# Patient Record
Sex: MALE | Race: WHITE | NOT HISPANIC OR LATINO | Employment: UNEMPLOYED | URBAN - METROPOLITAN AREA
[De-identification: names, ages, dates, MRNs, and addresses within clinical notes are randomized per-mention and may not be internally consistent; named-entity substitution may affect disease eponyms.]

---

## 2018-06-15 RX ORDER — CARBIDOPA AND LEVODOPA 25; 100 MG/1; MG/1
TABLET ORAL
Qty: 180 TABLET | Refills: 3 | COMMUNITY
Start: 2018-06-15 | End: 2018-06-19

## 2018-06-15 NOTE — PROGRESS NOTES
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     No other major issues.     [facility]  He is working for a [employer] in [place]    Negative family history of dystonia        Medications     9am 9pm       Carbidopa/levodopa Sinemet 25/100 1 +/-1                                                                                                                                  History obtained from patient      Coding statement:   Duration of  Services: patient care and care coordination was 10 minutes  Greater than 50% of this visit was spent in counseling and coordination of care.     Long Henry MD     ______________________________________    Last visit date and details:        PATIENT: Tres Ospina     : 1985     SUNSHINE: May 7, 2015     REASON FOR VISIT:  Dopamine responsive dystonia follow up.     HPI: Mr. Tres Ospina is a 29 year old ambidextrous  male who came to the [facility] neurology clinic by himself for a follow up visit.  I saw him last in the clinic in 2012 for a routine f/u & to give him a one-year Rx refill.   Since then, he had no shows & cancellations.  He came today as he was told that we'll not refill his Rx without seeing him.       He reports no worsening symptoms since .  He occasionally gets leg cramps, but is manageable.  Lately, he has been having problems with sleep.  His PCP was wondering if it was due to leg grams.  He rarely wakes up due to leg cramps.  He has been on Sinemet 25/100 mg 1 tab daily since .  He gets groggy in the morning after he takes it.  Otherwise, no side effects.       ROS: -   MOTOR FUNCTION   Tremor: Denies.   Bradykinesia: Denies.  Speech:  Denies.  Rigidity: Denies.   Gait: No change.   Falls: Denies.   Dyskinesia: Denies.   Pain: When he gets cramps in legs.  At times it's in right let & other times in left leg.    Numbness: Denies.   Tingling: Denies.      AUTONOMIC FUNCTION   Orthostatic Hypotension: Only when taking Sinemet on empty stomach.   Constipation: Denies.   Urinary symptoms: Denies.      MENTATION, BEHAVIOR, MOOD   Depression, anxiety: Has slight anxiety.   Fatigue: In the morning due to not sleeping well & side effect from Sinemet.   Memory problems: No.   Confusion, hallucinations: Denies.   Sleep Problems: He wakes up a few times at night.  At times has difficulty falling back to sleep.  He has used Melatonin low dose without much benefit.     OTHERS   Trouble swallowing, drooling: Denies.  Changes in sense of smelling: Denies.  Trouble with handwriting: Denies.  Trouble with handling utensils, dressing: Denies.   Exercise: Goes on a walk.  Will join gym next week.      Allscripts Notes Reviewed:       April 22, 2005: Note by Cole Guzman, Genetic Counselor     GENETIC COUNSELING-NEUROLOGY CLINIC  Tres Ospina is an 19-year-old man with dystonia.  Previous molecular testing was negative for the GAG deletion in the DYT1 gene.  Tres was subsequently put on a trial of Sinemet and had a dramatic response to this medication.  Based upon this response, DNA was forwarded from [facility] to the [facility] laboratory for GTP Cyclohydrolase sequencing.     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  This change has been previously described in individuals diagnosed with DRD.  In addition, functional assays have demonstrated that this change interferes with normal splicing of the GTP cyclohydrolase gene product.  Taken together,  "this evidence strongly suggests that this mutation is responsible for Justin' symptoms.        MEDICATIONS:        Medication Sig     carbidopa-levodopa (SINEMET)  MG per tablet 1 tab at 10am for dystonia      ALLERGIES:  No known allergies.     PROBLEM LIST: PMH:  DOPA responsive dystonia and Depression.     PSH: None.      FH:  Depression in his father, mother, and sister; Hypertension in his mother; Anxiety & ADHD in his sister.     SH:  Quit smoking in 2007. He only smoked for 6 months occasionally. Drinks alcohol occasionally. He does not use illicit drugs.  Lives by himself in [facility].  His girlfriend of 3 years recently broke up with him.       PHYSICAL EXAM:     VITAL SIGNS:  Blood pressure 122/79, pulse 66, height 1.892 m (6' 2.5\"), weight 70.761 kg (156 lb).  Body mass index is 19.77 kg/(m^2).     GENERAL:  Mr. Ospina is a pleasant  male who is well-groomed, well-developed and thin sitting comfortably in the exam room without any distress.  Affect is appropriate.     MOVEMENT DISORDERS ASSESSMENT: (Last Sinemet was about 1.5 hrs ago)  Speech: Normal.  Facial Expression: Flat.   Rest Tremor: Absent in all extremities.   Dystonia:  No noticeable dystonic posturing.   Action/Postural Tremor: Slight high-frequency low amplitude postural tremor bilaterally.  No action tremor.  Rigidity: Absent in all extremities.   Finger Taps: Normal.   Hand opening & closing: Normal.   Hand pronation & supination: Normal.   Leg Agility: Normal.   Arising from chair - arms folded across chest: Normal.  Posture: Normal erect.  Gait: Normal.  Postural Stability: Normal.  Body Bradykinesia: None.     ASSESSMENT/PLAN:     Mr. Ospina is a 29 year old  male with a diagnosis of dopamine responsive dystonia since 2005 who came to the clinic for a f/u visit.  Today's exam didn't show any dystonic posturing.  Pt reports that there is no progression in his motor symptoms.  Except for occasional leg " cramping, he has no additional symptoms.  He continues taking low dose Levodopa.     I reviewed notes & genetic testing as mentioned above, which were completed in 2005.  Since his leg cramping has responded to Levodopa, I'll refilled his Rx for 1 year.  He was informed to return to clinic in 1 year or sooner as needed.      The total time spent with the patient was 30 minutes, and greater than 50% of this time was spent in counseling and coordination of care.     Belkis Syed, APRN,  CNP  [facility]                ______________________________________      Patient was asked about 14 Review of systems including changes in vision (dry eyes, double vision), hearing, heart, lungs, musculoskeletal, depression, anxiety, snoring, RBD, insomnia, urinary frequency, urinary urgency, constipation, swallowing problems, hematological, ID, allergies, skin problems: seborrhea, endocrinological: thyroid, diabetes, cholesterol; balance, weight changes, and other neurological problems and these were not significant at this time except for   Patient Active Problem List   Diagnosis     DOPA responsive dystonia     Depression        No Known Allergies  No past surgical history on file.  Past Medical History:   Diagnosis Date     Depression      DOPA responsive dystonia 11/1/2011     Social History     Social History     Marital status: Single     Spouse name: N/A     Number of children: N/A     Years of education: N/A     Occupational History     Inventory Management      [employer]     Social History Main Topics     Smoking status: Former Smoker     Smokeless tobacco: Not on file      Comment: Quit in 2007. Smoked occasionally. Smoked for 6 months.     Alcohol use Yes      Comment: Drinks occaionally.      Drug use: No     Sexual activity: Not on file     Other Topics Concern     Not on file     Social History Narrative    Quit smoking in 2007. He only smoked for 6 months occasionally. Drinks alcohol  occaionally. Lives by himself in [facility].        Drug and lactation database from the [place] National Library of Medicine:  http://toxnet.nlm.nih.gov/cgi-bin/sis/htmlgen?LACT      B/P: Data Unavailable, T: Data Unavailable, P: Data Unavailable, R: Data Unavailable 0 lbs 0 oz  There were no vitals taken for this visit., There is no height or weight on file to calculate BMI.  Medications and Vitals not listed above were documented in the cart and reviewed by me.     Current Outpatient Prescriptions   Medication Sig Dispense Refill     carbidopa-levodopa (SINEMET)  MG per tablet Take 1 tablet by mouth 2 times daily 180 tablet 3         Long Henry MD  Answers for HPI/ROS submitted by the patient on 6/19/2018   General Symptoms: No  Skin Symptoms: No  HENT Symptoms: No  EYE SYMPTOMS: No  HEART SYMPTOMS: No  LUNG SYMPTOMS: No  INTESTINAL SYMPTOMS: No  URINARY SYMPTOMS: No  REPRODUCTIVE SYMPTOMS: No  SKELETAL SYMPTOMS: No  BLOOD SYMPTOMS: No  NERVOUS SYSTEM SYMPTOMS: No  MENTAL HEALTH SYMPTOMS: No

## 2018-06-19 ENCOUNTER — OFFICE VISIT (OUTPATIENT)
Dept: NEUROLOGY | Facility: CLINIC | Age: 33
End: 2018-06-19
Payer: COMMERCIAL

## 2018-06-19 VITALS
DIASTOLIC BLOOD PRESSURE: 90 MMHG | HEIGHT: 74 IN | BODY MASS INDEX: 21.96 KG/M2 | HEART RATE: 65 BPM | SYSTOLIC BLOOD PRESSURE: 145 MMHG | WEIGHT: 171.1 LBS

## 2018-06-19 DIAGNOSIS — G24.9 DYSTONIA: ICD-10-CM

## 2018-06-19 DIAGNOSIS — G24.1 DOPA RESPONSIVE DYSTONIA: Primary | ICD-10-CM

## 2018-06-19 RX ORDER — CARBIDOPA AND LEVODOPA 25; 100 MG/1; MG/1
TABLET ORAL
Qty: 180 TABLET | Refills: 3 | Status: SHIPPED | OUTPATIENT
Start: 2018-06-19 | End: 2019-12-24

## 2018-06-19 RX ORDER — CARBIDOPA AND LEVODOPA 25; 100 MG/1; MG/1
1 TABLET ORAL
COMMUNITY
Start: 2017-01-24 | End: 2018-06-19

## 2018-06-19 ASSESSMENT — PAIN SCALES - GENERAL: PAINLEVEL: NO PAIN (0)

## 2018-06-19 NOTE — LETTER
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     No other major issues.     [facility]  He is working for a [employer] in [place]    Negative family history of dystonia        Medications     9am 9pm       Carbidopa/levodopa Sinemet 25/100 1 +/-1                                                                                                                                  History obtained from patient      Coding statement:   Duration of  Services: patient care and care coordination was 10 minutes  Greater than 50% of this visit was spent in counseling and coordination of care.     Long Henry MD     ______________________________________    Last visit date and details:        PATIENT: Tres Ospina     : 1985     SUNSHINE: May 7, 2015     REASON FOR VISIT:  Dopamine responsive dystonia follow up.     HPI: Mr. Tres Ospina is a 29 year old ambidextrous  male who came to the [facility] neurology clinic by himself for a follow up visit.  I saw him last in the clinic in 2012 for a routine f/u & to give him a one-year Rx refill.   Since then, he had no shows & cancellations.  He came today as he was told that we'll not refill his Rx without seeing him.       He reports no worsening symptoms since .  He occasionally gets leg cramps, but is manageable.  Lately, he has been having problems with sleep.  His PCP was wondering if it was due to leg grams.  He rarely wakes up due to leg cramps.  He has been on Sinemet 25/100 mg 1 tab daily since .  He gets groggy in the morning after he takes it.  Otherwise, no  side effects.       ROS: -   MOTOR FUNCTION   Tremor: Denies.   Bradykinesia: Denies.  Speech: Denies.  Rigidity: Denies.   Gait: No change.   Falls: Denies.   Dyskinesia: Denies.   Pain: When he gets cramps in legs.  At times it's in right let & other times in left leg.    Numbness: Denies.   Tingling: Denies.      AUTONOMIC FUNCTION   Orthostatic Hypotension: Only when taking Sinemet on empty stomach.   Constipation: Denies.   Urinary symptoms: Denies.      MENTATION, BEHAVIOR, MOOD   Depression, anxiety: Has slight anxiety.   Fatigue: In the morning due to not sleeping well & side effect from Sinemet.   Memory problems: No.   Confusion, hallucinations: Denies.   Sleep Problems: He wakes up a few times at night.  At times has difficulty falling back to sleep.  He has used Melatonin low dose without much benefit.     OTHERS   Trouble swallowing, drooling: Denies.  Changes in sense of smelling: Denies.  Trouble with handwriting: Denies.  Trouble with handling utensils, dressing: Denies.   Exercise: Goes on a walk.  Will join gym next week.      Allscripts Notes Reviewed:       April 22, 2005: Note by Cole Guzman, Genetic Counselor     GENETIC COUNSELING-NEUROLOGY CLINIC  Tres Ospina is an 19-year-old man with dystonia.  Previous molecular testing was negative for the GAG deletion in the DYT1 gene.  Tres was subsequently put on a trial of Sinemet and had a dramatic response to this medication.  Based upon this response, DNA was forwarded from [facility] to the [facility] laboratory for GTP Cyclohydrolase sequencing.     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  This change has been previously described in individuals diagnosed with DRD.  In addition, functional assays have demonstrated that this  "change interferes with normal splicing of the GTP cyclohydrolase gene product.  Taken together, this evidence strongly suggests that this mutation is responsible for Justin' symptoms.        MEDICATIONS:        Medication Sig     carbidopa-levodopa (SINEMET)  MG per tablet 1 tab at 10am for dystonia      ALLERGIES:  No known allergies.     PROBLEM LIST: PMH:  DOPA responsive dystonia and Depression.     PSH: None.      FH:  Depression in his father, mother, and sister; Hypertension in his mother; Anxiety & ADHD in his sister.     SH:  Quit smoking in 2007. He only smoked for 6 months occasionally. Drinks alcohol occasionally. He does not use illicit drugs.  Lives by himself in [facility].  His girlfriend of 3 years recently broke up with him.       PHYSICAL EXAM:     VITAL SIGNS:  Blood pressure 122/79, pulse 66, height 1.892 m (6' 2.5\"), weight 70.761 kg (156 lb).  Body mass index is 19.77 kg/(m^2).     GENERAL:  Mr. Ospina is a pleasant  male who is well-groomed, well-developed and thin sitting comfortably in the exam room without any distress.  Affect is appropriate.     MOVEMENT DISORDERS ASSESSMENT: (Last Sinemet was about 1.5 hrs ago)  Speech: Normal.  Facial Expression: Flat.   Rest Tremor: Absent in all extremities.   Dystonia:  No noticeable dystonic posturing.   Action/Postural Tremor: Slight high-frequency low amplitude postural tremor bilaterally.  No action tremor.  Rigidity: Absent in all extremities.   Finger Taps: Normal.   Hand opening & closing: Normal.   Hand pronation & supination: Normal.   Leg Agility: Normal.   Arising from chair - arms folded across chest: Normal.  Posture: Normal erect.  Gait: Normal.  Postural Stability: Normal.  Body Bradykinesia: None.     ASSESSMENT/PLAN:     Mr. Ospina is a 29 year old  male with a diagnosis of dopamine responsive dystonia since 2005 who came to the clinic for a f/u visit.  Today's exam didn't show any dystonic " posturing.  Pt reports that there is no progression in his motor symptoms.  Except for occasional leg cramping, he has no additional symptoms.  He continues taking low dose Levodopa.     I reviewed notes & genetic testing as mentioned above, which were completed in 2005.  Since his leg cramping has responded to Levodopa, I'll refilled his Rx for 1 year.  He was informed to return to clinic in 1 year or sooner as needed.      The total time spent with the patient was 30 minutes, and greater than 50% of this time was spent in counseling and coordination of care.     Belkis Syed, APRN,  CNP  [facility]                ______________________________________      Patient was asked about 14 Review of systems including changes in vision (dry eyes, double vision), hearing, heart, lungs, musculoskeletal, depression, anxiety, snoring, RBD, insomnia, urinary frequency, urinary urgency, constipation, swallowing problems, hematological, ID, allergies, skin problems: seborrhea, endocrinological: thyroid, diabetes, cholesterol; balance, weight changes, and other neurological problems and these were not significant at this time except for   Patient Active Problem List   Diagnosis     DOPA responsive dystonia     Depression        No Known Allergies  No past surgical history on file.  Past Medical History:   Diagnosis Date     Depression      DOPA responsive dystonia 11/1/2011     Social History     Social History     Marital status: Single     Spouse name: N/A     Number of children: N/A     Years of education: N/A     Occupational History     Inventory Management      [employer]     Social History Main Topics     Smoking status: Former Smoker     Smokeless tobacco: Not on file      Comment: Quit in 2007. Smoked occasionally. Smoked for 6 months.     Alcohol use Yes      Comment: Drinks occaionally.      Drug use: No     Sexual activity: Not on file     Other Topics Concern     Not on file      Social History Narrative    Quit smoking in 2007. He only smoked for 6 months occasionally. Drinks alcohol occaionally. Lives by himself in [facility].        Drug and lactation database from the [place] National Library of Medicine:  http://toxnet.nlm.nih.gov/cgi-bin/sis/htmlgen?LACT      B/P: Data Unavailable, T: Data Unavailable, P: Data Unavailable, R: Data Unavailable 0 lbs 0 oz  There were no vitals taken for this visit., There is no height or weight on file to calculate BMI.  Medications and Vitals not listed above were documented in the cart and reviewed by me.     Current Outpatient Prescriptions   Medication Sig Dispense Refill     carbidopa-levodopa (SINEMET)  MG per tablet Take 1 tablet by mouth 2 times daily 180 tablet 3         Long Henry MD

## 2018-06-19 NOTE — Clinical Note
2018       RE: Tres Ospina  [address]  [place] [address]     Dear Colleague,    Thank you for referring your patient, Tres Ospina, to the [facility] NEUROLOGY at [facility]. Please see a copy of my visit note below.    Diagnosis/Summary/Recommendations:    PATIENT: Tres Ospina  32 year old male     : 1985    SUNSHINE: 2018    Dopa responsive dystonia    Medications            Carbidopa/levodopa Sinemet 25/100                                                                                                                                    History obtained from patient      Coding statement:   Duration of  Services: patient care and care coordination was *** minutes  Greater than 50% of this visit was spent in counseling and coordination of care.     Long Henry MD     ______________________________________    Last visit date and details:        PATIENT: Tres Ospina     : 1985     SUNSHINE: May 7, 2015     REASON FOR VISIT:  Dopamine responsive dystonia follow up.     HPI: Mr. Tres Ospina is a 29 year old ambidextrous  male who came to the [facility] neurology clinic by himself for a follow up visit.  I saw him last in the clinic in 2012 for a routine f/u & to give him a one-year Rx refill.   Since then, he had no shows & cancellations.  He came today as he was told that we'll not refill his Rx without seeing him.       He reports no worsening symptoms since .  He occasionally gets leg cramps, but is manageable.  Lately, he has been having problems with sleep.  His PCP was wondering if it was due to leg grams.  He rarely wakes up due to leg cramps.  He has been on Sinemet 25/100 mg 1 tab daily since .  He gets groggy in the morning after he takes it.  Otherwise, no side effects.       ROS: -   MOTOR FUNCTION   Tremor: Denies.   Bradykinesia: Denies.  Speech: Denies.  Rigidity: Denies.    Gait: No change.   Falls: Denies.   Dyskinesia: Denies.   Pain: When he gets cramps in legs.  At times it's in right let & other times in left leg.    Numbness: Denies.   Tingling: Denies.      AUTONOMIC FUNCTION   Orthostatic Hypotension: Only when taking Sinemet on empty stomach.   Constipation: Denies.   Urinary symptoms: Denies.      MENTATION, BEHAVIOR, MOOD   Depression, anxiety: Has slight anxiety.   Fatigue: In the morning due to not sleeping well & side effect from Sinemet.   Memory problems: No.   Confusion, hallucinations: Denies.   Sleep Problems: He wakes up a few times at night.  At times has difficulty falling back to sleep.  He has used Melatonin low dose without much benefit.     OTHERS   Trouble swallowing, drooling: Denies.  Changes in sense of smelling: Denies.  Trouble with handwriting: Denies.  Trouble with handling utensils, dressing: Denies.   Exercise: Goes on a walk.  Will join gym next week.      [employer] Notes Reviewed:       April 22, 2005: Note by Cole Guzman, Genetic Counselor     GENETIC COUNSELING-NEUROLOGY CLINIC  Tres Ospina is an 19-year-old man with dystonia.  Previous molecular testing was negative for the GAG deletion in the DYT1 gene.  Tres was subsequently put on a trial of Sinemet and had a dramatic response to this medication.  Based upon this response, DNA was forwarded from [facility] to the [facility] laboratory for GTP Cyclohydrolase sequencing.     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  This change has been previously described in individuals diagnosed with DRD.  In addition, functional assays have demonstrated that this change interferes with normal splicing of the GTP cyclohydrolase gene product.  Taken together, this evidence strongly  "suggests that this mutation is responsible for Justin' symptoms.        MEDICATIONS:        Medication Sig     carbidopa-levodopa (SINEMET)  MG per tablet 1 tab at 10am for dystonia      ALLERGIES:  No known allergies.     PROBLEM LIST: PMH:  DOPA responsive dystonia and Depression.     PSH: None.      FH:  Depression in his father, mother, and sister; Hypertension in his mother; Anxiety & ADHD in his sister.     SH:  Quit smoking in 2007. He only smoked for 6 months occasionally. Drinks alcohol occasionally. He does not use illicit drugs.  Lives by himself in [facility].  His girlfriend of 3 years recently broke up with him.       PHYSICAL EXAM:     VITAL SIGNS:  Blood pressure 122/79, pulse 66, height 1.892 m (6' 2.5\"), weight 70.761 kg (156 lb).  Body mass index is 19.77 kg/(m^2).     GENERAL:  Mr. Ospina is a pleasant  male who is well-groomed, well-developed and thin sitting comfortably in the exam room without any distress.  Affect is appropriate.     MOVEMENT DISORDERS ASSESSMENT: (Last Sinemet was about 1.5 hrs ago)  Speech: Normal.  Facial Expression: Flat.   Rest Tremor: Absent in all extremities.   Dystonia:  No noticeable dystonic posturing.   Action/Postural Tremor: Slight high-frequency low amplitude postural tremor bilaterally.  No action tremor.  Rigidity: Absent in all extremities.   Finger Taps: Normal.   Hand opening & closing: Normal.   Hand pronation & supination: Normal.   Leg Agility: Normal.   Arising from chair - arms folded across chest: Normal.  Posture: Normal erect.  Gait: Normal.  Postural Stability: Normal.  Body Bradykinesia: None.     ASSESSMENT/PLAN:     Mr. Ospina is a 29 year old  male with a diagnosis of dopamine responsive dystonia since 2005 who came to the clinic for a f/u visit.  Today's exam didn't show any dystonic posturing.  Pt reports that there is no progression in his motor symptoms.  Except for occasional leg cramping, he has no " additional symptoms.  He continues taking low dose Levodopa.     I reviewed notes & genetic testing as mentioned above, which were completed in 2005.  Since his leg cramping has responded to Levodopa, I'll refilled his Rx for 1 year.  He was informed to return to clinic in 1 year or sooner as needed.      The total time spent with the patient was 30 minutes, and greater than 50% of this time was spent in counseling and coordination of care.     Belkis Syed, APRN,  CNP  [facility]                ______________________________________      Patient was asked about 14 Review of systems including changes in vision (dry eyes, double vision), hearing, heart, lungs, musculoskeletal, depression, anxiety, snoring, RBD, insomnia, urinary frequency, urinary urgency, constipation, swallowing problems, hematological, ID, allergies, skin problems: seborrhea, endocrinological: thyroid, diabetes, cholesterol; balance, weight changes, and other neurological problems and these were not significant at this time except for   Patient Active Problem List   Diagnosis     DOPA responsive dystonia     Depression        No Known Allergies  No past surgical history on file.  Past Medical History:   Diagnosis Date     Depression      DOPA responsive dystonia 11/1/2011     Social History     Social History     Marital status: Single     Spouse name: N/A     Number of children: N/A     Years of education: N/A     Occupational History     Inventory Management      [employer]     Social History Main Topics     Smoking status: Former Smoker     Smokeless tobacco: Not on file      Comment: Quit in 2007. Smoked occasionally. Smoked for 6 months.     Alcohol use Yes      Comment: Drinks occaionally.      Drug use: No     Sexual activity: Not on file     Other Topics Concern     Not on file     Social History Narrative    Quit smoking in 2007. He only smoked for 6 months occasionally. Drinks alcohol occaionally. Lives  by himself in [facility].        Drug and lactation database from the [place] National Library of Medicine:  http://toxnet.nlm.nih.gov/cgi-bin/sis/htmlgen?LACT      B/P: Data Unavailable, T: Data Unavailable, P: Data Unavailable, R: Data Unavailable 0 lbs 0 oz  There were no vitals taken for this visit., There is no height or weight on file to calculate BMI.  Medications and Vitals not listed above were documented in the cart and reviewed by me.     Current Outpatient Prescriptions   Medication Sig Dispense Refill     carbidopa-levodopa (SINEMET)  MG per tablet Take 1 tablet by mouth 2 times daily 180 tablet 3         Long Henry MD    Again, thank you for allowing me to participate in the care of your patient.      Sincerely,    Long Henry MD

## 2018-06-19 NOTE — MR AVS SNAPSHOT
"              After Visit Summary   2018    Tres Ospina    MRN: [id number]           Patient Information     Date Of Birth          1985        Visit Information        Provider Department      2018 7:10 AM Long Henry MD [facility]        Today's Diagnoses     DOPA responsive dystonia    -  1    Dystonia           Follow-ups after your visit        Follow-up notes from your care team     Return in about 1 year (around 2019).      Who to contact     Please call your clinic at [phone] to:    Ask questions about your health    Make or cancel appointments    Discuss your medicines    Learn about your test results    Speak to your doctor            Additional Information About Your Visit        MyChart Information     [employer] is an electronic gateway that provides easy, online access to your medical records. With [employer], you can request a clinic appointment, read your test results, renew a prescription or communicate with your care team.     To sign up for [employer] visit the website at [email or web address]   You will be asked to enter the access code listed below, as well as some personal information. Please follow the directions to create your username and password.     Your access code is: [email or web address]  Expires: 2018  6:30 AM     Your access code will  in 90 days. If you need help or a new code, please contact your [facility] or call [phone] for assistance.        Care EveryWhere ID     This is your Care EveryWhere ID. This could be used by other organizations to access your [place] medical records  [id number]        Your Vitals Were     Pulse Height BMI (Body Mass Index)             65 1.886 m (6' 2.25\") 21.82 kg/m2          Blood Pressure from Last 3 Encounters:   18 145/90   05/07/15 122/79   12 128/83    Weight from Last 3 Encounters:   18 77.6 kg (171 lb 1.6 oz)   05/07/15 70.8 kg (156 lb) "   07/16/12 68.9 kg (151 lb 14.4 oz)              Today, you had the following     No orders found for display         Today's Medication Changes          These changes are accurate as of 6/19/18  7:27 AM.  If you have any questions, ask your nurse or doctor.               These medicines have changed or have updated prescriptions.        Dose/Directions    carbidopa-levodopa  MG per tablet   Commonly known as:  SINEMET   This may have changed:    - how much to take  - additional instructions   Used for:  DOPA responsive dystonia   Changed by:  Long Henry MD        25/100 tab by mouth twice daily   Quantity:  180 tablet   Refills:  3            Where to get your medicines      These medications were sent to [id number] - [place], [place] - [address], [place] [address]     Phone:  [phone]     carbidopa-levodopa  MG per tablet                Primary Care Provider    None Specified       No primary provider on file.        Equal Access to Services     SAMMIE GOSS : Ana Lilia galloo Sobairon, waaxda luqadaha, qaybta kaalmada adeaugustinyarika, harpreet parikh . So [facility] [phone].    ATENCIÓN: Si habla español, tiene a palafox disposición servicios gratuitos de asistencia lingüística. Jessiame al [phone].    We comply with applicable federal civil rights laws and [place] laws. We do not discriminate on the basis of race, color, national origin, age, disability, sex, sexual orientation, or gender identity.            Thank you!     Thank you for choosing [facility]UR[facility]  for your care. Our goal is always to provide you with excellent care. Hearing back from our patients is one way we can continue to improve our services. Please take a few minutes to complete the written survey that you may receive in the mail after your visit with us. Thank you!             Your Updated Medication List - Protect  others around you: Learn how to safely use, store and throw away your medicines at www.disposemymeds.org.          This list is accurate as of 6/19/18  7:27 AM.  Always use your most recent med list.                   Brand Name Dispense Instructions for use Diagnosis    carbidopa-levodopa  MG per tablet    SINEMET    180 tablet    25/100 tab by mouth twice daily    DOPA responsive dystonia

## 2019-12-24 DIAGNOSIS — G24.1 DOPA RESPONSIVE DYSTONIA: ICD-10-CM

## 2019-12-24 RX ORDER — CARBIDOPA AND LEVODOPA 25; 100 MG/1; MG/1
TABLET ORAL
Qty: 60 TABLET | Refills: 0 | Status: SHIPPED | OUTPATIENT
Start: 2019-12-24 | End: 2020-02-14

## 2019-12-24 NOTE — TELEPHONE ENCOUNTER
Nurse received refill request for: carbidopa/levodopa  mg    Pharmacy:[facility]    Last re-ordered: 6/19/2018    Last appointment: 6/19/2018    Next appointment: 1/24/2020    Action taken: Will send a 1 month supply to be signed by the provider. This will last him until he is seen in clinic on 1/24.

## 2019-12-24 NOTE — TELEPHONE ENCOUNTER
Health Call Center    Phone Message    May a detailed message be left on voicemail: yes    Reason for Call: Medication Refill Request    Has the patient contacted the pharmacy for the refill? Yes   Name of medication being requested: carbidopa-levodopa (SINEMET)  MG per tablet  Provider who prescribed the medication: Dr. Henry   Pharmacy: [id number] - [place], [place] - [address]  Date medication is needed: 12/24/19 Patient states he has 4 days of medication left    Action Taken: Message routed to:  [facility]

## 2020-02-14 ENCOUNTER — TELEPHONE (OUTPATIENT)
Dept: NEUROLOGY | Facility: CLINIC | Age: 35
End: 2020-02-14

## 2020-02-14 DIAGNOSIS — G24.1 DOPA RESPONSIVE DYSTONIA: ICD-10-CM

## 2020-02-14 NOTE — TELEPHONE ENCOUNTER
Rx Authorization:    Requested Medication/ Dose: Carbidopa-Levodopa 25-100mg    Date last refill ordered: 12/24/2019    Quantity ordered: 60    # refills: 0    Date of last clinic visit with ordering provider: 1/24/20    Date of next clinic visit with ordering provider: 3/3/20    All pertinent protocol data (lab date/result):     Include pertinent information from patients message:

## 2020-02-14 NOTE — TELEPHONE ENCOUNTER
Rx Authorization:    Requested Medication/ Dose:25-100mg    Date last refill ordered: 12/24/19    Quantity ordered: 60tabs    # refills:     Date of last clinic visit with ordering provider: 1/24/20    Date of next clinic visit with ordering provider: 3/3/20    All pertinent protocol data (lab date/result):     Include pertinent information from patients message:

## 2020-02-14 NOTE — TELEPHONE ENCOUNTER
Health Call Center    Phone Message    May a detailed message be left on voicemail: yes     Reason for Call: Medication Refill Request    Has the patient contacted the pharmacy for the refill? Yes   Name of medication being requested: carbidopa-levodopa (SINEMET)  MG tablet    Provider who prescribed the medication: Dr. Henry  Pharmacy: [id number] - [place], [place] - [address]  Date medication is needed: Per Pt, he took his last pill today and needs filled today if possible. Patient requesting call from clinic if that is not possible.     Action Taken: Message routed to:  [facility] Neurology Clinic     Travel Screening: Not Applicable

## 2020-02-15 RX ORDER — CARBIDOPA AND LEVODOPA 25; 100 MG/1; MG/1
TABLET ORAL
Qty: 180 TABLET | Refills: 3 | Status: SHIPPED | OUTPATIENT
Start: 2020-02-15 | End: 2021-03-15

## 2020-02-15 RX ORDER — CARBIDOPA AND LEVODOPA 25; 100 MG/1; MG/1
TABLET ORAL
Qty: 60 TABLET | Refills: 1 | Status: SHIPPED | OUTPATIENT
Start: 2020-02-15 | End: 2020-03-03

## 2020-02-24 NOTE — PROGRESS NOTES
Diagnosis/Summary/Recommendations:    PATIENT: Tres Ospina  34 year old male     : 1985    SUNSHINE: March 3, 2020  Dopa responsive dystonia    Lived for [facility] for a while.     Working fine.          Medications                 Carbidopa/levodopa sinemet 25/100 1   1                                                                                     History obtained from patient      Coding statement:   Duration of  Services: patient care and care coordination was 10 minutes  Greater than 50% of this visit was spent in counseling and coordination of care.     Long Henry MD     ______________________________________    Last visit date and details:     SUNSHINE: May 7, 2015     REASON FOR VISIT:  Dopamine responsive dystonia follow up.     HPI: Mr. Tres Ospina is a 29 year old ambidextrous  male who came to the [facility] neurology clinic by himself for a follow up visit.  I saw him last in the clinic in 2012 for a routine f/u & to give him a one-year Rx refill.   Since then, he had no shows & cancellations.  He came today as he was told that we'll not refill his Rx without seeing him.       He reports no worsening symptoms since .  He occasionally gets leg cramps, but is manageable.  Lately, he has been having problems with sleep.  His PCP was wondering if it was due to leg grams.  He rarely wakes up due to leg cramps.  He has been on Sinemet 25/100 mg 1 tab daily since .  He gets groggy in the morning after he takes it.  Otherwise, no side effects.       ROS: -   MOTOR FUNCTION   Tremor: Denies.   Bradykinesia: Denies.  Speech: Denies.  Rigidity: Denies.   Gait: No change.   Falls: Denies.   Dyskinesia: Denies.   Pain: When he gets cramps in legs.  At times it's in right let & other times in left leg.    Numbness: Denies.   Tingling: Denies.      AUTONOMIC FUNCTION   Orthostatic Hypotension: Only when taking Sinemet on empty stomach.   Constipation: Denies.   Urinary symptoms:  Denies.      MENTATION, BEHAVIOR, MOOD   Depression, anxiety: Has slight anxiety.   Fatigue: In the morning due to not sleeping well & side effect from Sinemet.   Memory problems: No.   Confusion, hallucinations: Denies.   Sleep Problems: He wakes up a few times at night.  At times has difficulty falling back to sleep.  He has used Melatonin low dose without much benefit.     OTHERS   Trouble swallowing, drooling: Denies.  Changes in sense of smelling: Denies.  Trouble with handwriting: Denies.  Trouble with handling utensils, dressing: Denies.   Exercise: Goes on a walk.  Will join gym next week.      Allscripts Notes Reviewed:       April 22, 2005: Note by Cole Guzman, Genetic Counselor     GENETIC COUNSELING-NEUROLOGY CLINIC  Tres Ospina is an 19-year-old man with dystonia.  Previous molecular testing was negative for the GAG deletion in the DYT1 gene.  Tres was subsequently put on a trial of Sinemet and had a dramatic response to this medication.  Based upon this response, DNA was forwarded from [facility] to the [facility] laboratory for GTP Cyclohydrolase sequencing.     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  This change has been previously described in individuals diagnosed with DRD.  In addition, functional assays have demonstrated that this change interferes with normal splicing of the GTP cyclohydrolase gene product.  Taken together, this evidence strongly suggests that this mutation is responsible for Justin' symptoms.        MEDICATIONS:           Medication Sig     carbidopa-levodopa (SINEMET)  MG per tablet 1 tab at 10am for dystonia      ALLERGIES:  No known allergies.     PROBLEM LIST: PMH:  DOPA responsive dystonia and Depression.     PSH: None.      FH:  Depression in  "his father, mother, and sister; Hypertension in his mother; Anxiety & ADHD in his sister.     SH:  Quit smoking in 2007. He only smoked for 6 months occasionally. Drinks alcohol occasionally. He does not use illicit drugs.  Lives by himself in [facility].  His girlfriend of 3 years recently broke up with him.       PHYSICAL EXAM:     VITAL SIGNS:  Blood pressure 122/79, pulse 66, height 1.892 m (6' 2.5\"), weight 70.761 kg (156 lb).  Body mass index is 19.77 kg/(m^2).     GENERAL:  Mr. Ospina is a pleasant  male who is well-groomed, well-developed and thin sitting comfortably in the exam room without any distress.  Affect is appropriate.     MOVEMENT DISORDERS ASSESSMENT: (Last Sinemet was about 1.5 hrs ago)  Speech: Normal.  Facial Expression: Flat.   Rest Tremor: Absent in all extremities.   Dystonia:  No noticeable dystonic posturing.   Action/Postural Tremor: Slight high-frequency low amplitude postural tremor bilaterally.  No action tremor.  Rigidity: Absent in all extremities.   Finger Taps: Normal.   Hand opening & closing: Normal.   Hand pronation & supination: Normal.   Leg Agility: Normal.   Arising from chair - arms folded across chest: Normal.  Posture: Normal erect.  Gait: Normal.  Postural Stability: Normal.  Body Bradykinesia: None.     ASSESSMENT/PLAN:     Mr. Ospina is a 29 year old  male with a diagnosis of dopamine responsive dystonia since 2005 who came to the clinic for a f/u visit.  Today's exam didn't show any dystonic posturing.  Pt reports that there is no progression in his motor symptoms.  Except for occasional leg cramping, he has no additional symptoms.  He continues taking low dose Levodopa.     I reviewed notes & genetic testing as mentioned above, which were completed in 2005.  Since his leg cramping has responded to Levodopa, I'll refilled his Rx for 1 year.  He was informed to return to clinic in 1 year or sooner as needed.      The total time spent with " the patient was 30 minutes, and greater than 50% of this time was spent in counseling and coordination of care.     Belkis Syed APRN,  CNP  [facility]             ______________________________________      Patient was asked about 14 Review of systems including changes in vision (dry eyes, double vision), hearing, heart, lungs, musculoskeletal, depression, anxiety, snoring, RBD, insomnia, urinary frequency, urinary urgency, constipation, swallowing problems, hematological, ID, allergies, skin problems: seborrhea, endocrinological: thyroid, diabetes, cholesterol; balance, weight changes, and other neurological problems and these were not significant at this time except for   Patient Active Problem List   Diagnosis     DOPA responsive dystonia     Depression     Dystonia        No Known Allergies  Past Surgical History:   Procedure Laterality Date     NO HISTORY OF SURGERY       Past Medical History:   Diagnosis Date     Depression      DOPA responsive dystonia 11/1/2011     Social History     Socioeconomic History     Marital status: Single     Spouse name: Not on file     Number of children: Not on file     Years of education: Not on file     Highest education level: Not on file   Occupational History     Occupation: [employer]     Comment: [employer]   Social Needs     Financial resource strain: Not on file     Food insecurity:     Worry: Not on file     Inability: Not on file     Transportation needs:     Medical: Not on file     Non-medical: Not on file   Tobacco Use     Smoking status: Current Some Day Smoker     Smokeless tobacco: Never Used     Tobacco comment: Quit in 2007. Smoked occasionally. Smoked for 6 months.   Substance and Sexual Activity     Alcohol use: Yes     Comment: Drinks occaionally.      Drug use: No     Sexual activity: Not on file   Lifestyle     Physical activity:     Days per week: Not on file     Minutes per session: Not on file     Stress:  Not on file   Relationships     Social connections:     Talks on phone: Not on file     Gets together: Not on file     Attends Catholic service: Not on file     Active member of club or organization: Not on file     Attends meetings of clubs or organizations: Not on file     Relationship status: Not on file     Intimate partner violence:     Fear of current or ex partner: Not on file     Emotionally abused: Not on file     Physically abused: Not on file     Forced sexual activity: Not on file   Other Topics Concern     Parent/sibling w/ CABG, MI or angioplasty before 65F 55M? Not Asked   Social History Narrative    Quit smoking in 2007. He only smoked for 6 months occasionally. Drinks alcohol occaionally. Lives by himself in [facility].        Drug and lactation database from the [place] National Library of Medicine:  http://toxnet.nlm.nih.gov/cgi-bin/sis/htmlgen?LACT      B/P: Data Unavailable, T: Data Unavailable, P: Data Unavailable, R: Data Unavailable 0 lbs 0 oz  There were no vitals taken for this visit., There is no height or weight on file to calculate BMI.  Medications and Vitals not listed above were documented in the cart and reviewed by me.     Current Outpatient Medications   Medication Sig Dispense Refill     carbidopa-levodopa (SINEMET)  MG tablet 25/100 tab by mouth twice daily 180 tablet 3     carbidopa-levodopa (SINEMET)  MG tablet TAKE 1 TABLET BY MOUTH TWICE DAILY 60 tablet 1         Long Henry MD

## 2020-03-03 ENCOUNTER — OFFICE VISIT (OUTPATIENT)
Dept: NEUROLOGY | Facility: CLINIC | Age: 35
End: 2020-03-03
Payer: COMMERCIAL

## 2020-03-03 VITALS
SYSTOLIC BLOOD PRESSURE: 129 MMHG | OXYGEN SATURATION: 97 % | BODY MASS INDEX: 22.84 KG/M2 | DIASTOLIC BLOOD PRESSURE: 87 MMHG | WEIGHT: 179.1 LBS | HEART RATE: 57 BPM

## 2020-03-03 DIAGNOSIS — G24.1 DOPA RESPONSIVE DYSTONIA: Primary | ICD-10-CM

## 2020-03-03 ASSESSMENT — PAIN SCALES - GENERAL: PAINLEVEL: MODERATE PAIN (4)

## 2020-03-03 ASSESSMENT — PATIENT HEALTH QUESTIONNAIRE - PHQ9: SUM OF ALL RESPONSES TO PHQ QUESTIONS 1-9: 1

## 2020-03-03 NOTE — NURSING NOTE
Chief Complaint   Patient presents with     RECHECK     [facility] RETURN - FOLLOW UP, MED REFILL       Tres Dennison, EMT

## 2020-03-03 NOTE — LETTER
3/3/2020       RE: Tres Ospina  [address]  [place] [address]     Dear Colleague,    Thank you for referring your patient, Tres Ospina, to the [facility] NEUROLOGY at [facility]. Please see a copy of my visit note below.    Diagnosis/Summary/Recommendations:    PATIENT: Tres Ospina  34 year old male     : 1985    SUNSHINE: March 3, 2020  Dopa responsive dystonia    Lived for [employer] [facility] [employer] for a while.     Working fine.          Medications                 Carbidopa/levodopa sinemet 25/100 1   1                                                                                     History obtained from patient      Coding statement:   Duration of  Services: patient care and care coordination was 10 minutes  Greater than 50% of this visit was spent in counseling and coordination of care.     Long Henry MD     ______________________________________    Last visit date and details:     SUNSHINE: May 7, 2015     REASON FOR VISIT:  Dopamine responsive dystonia follow up.     HPI: Mr. Tres Ospina is a 29 year old ambidextrous  male who came to the [facility] neurology clinic by himself for a follow up visit.  I saw him last in the clinic in 2012 for a routine f/u & to give him a one-year Rx refill.   Since then, he had no shows & cancellations.  He came today as he was told that we'll not refill his Rx without seeing him.       He reports no worsening symptoms since .  He occasionally gets leg cramps, but is manageable.  Lately, he has been having problems with sleep.  His PCP was wondering if it was due to leg grams.  He rarely wakes up due to leg cramps.  He has been on Sinemet 25/100 mg 1 tab daily since .  He gets groggy in the morning after he takes it.  Otherwise, no side effects.       ROS: -   MOTOR FUNCTION   Tremor: Denies.   Bradykinesia: Denies.  Speech: Denies.  Rigidity: Denies.   Gait: No change.   Falls: Denies.    Dyskinesia: Denies.   Pain: When he gets cramps in legs.  At times it's in right let & other times in left leg.    Numbness: Denies.   Tingling: Denies.      AUTONOMIC FUNCTION   Orthostatic Hypotension: Only when taking Sinemet on empty stomach.   Constipation: Denies.   Urinary symptoms: Denies.      MENTATION, BEHAVIOR, MOOD   Depression, anxiety: Has slight anxiety.   Fatigue: In the morning due to not sleeping well & side effect from Sinemet.   Memory problems: No.   Confusion, hallucinations: Denies.   Sleep Problems: He wakes up a few times at night.  At times has difficulty falling back to sleep.  He has used Melatonin low dose without much benefit.     OTHERS   Trouble swallowing, drooling: Denies.  Changes in sense of smelling: Denies.  Trouble with handwriting: Denies.  Trouble with handling utensils, dressing: Denies.   Exercise: Goes on a walk.  Will join gym next week.      Allscripts Notes Reviewed:       April 22, 2005: Note by Cole Guzman, Genetic Counselor     GENETIC COUNSELING-NEUROLOGY CLINIC  Tres Ospina is an 19-year-old man with dystonia.  Previous molecular testing was negative for the GAG deletion in the DYT1 gene.  Tres was subsequently put on a trial of Sinemet and had a dramatic response to this medication.  Based upon this response, DNA was forwarded from [facility] to the [email or web address] laboratory for GTP Cyclohydrolase sequencing.     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  This change has been previously described in individuals diagnosed with DRD.  In addition, functional assays have demonstrated that this change interferes with normal splicing of the GTP cyclohydrolase gene product.  Taken together, this evidence strongly suggests that this mutation is  "responsible for Justin' symptoms.        MEDICATIONS:           Medication Sig     carbidopa-levodopa (SINEMET)  MG per tablet 1 tab at 10am for dystonia      ALLERGIES:  No known allergies.     PROBLEM LIST: PMH:  DOPA responsive dystonia and Depression.     PSH: None.      FH:  Depression in his father, mother, and sister; Hypertension in his mother; Anxiety & ADHD in his sister.     SH:  Quit smoking in 2007. He only smoked for 6 months occasionally. Drinks alcohol occasionally. He does not use illicit drugs.  Lives by himself in [facility].  His girlfriend of 3 years recently broke up with him.       PHYSICAL EXAM:     VITAL SIGNS:  Blood pressure 122/79, pulse 66, height 1.892 m (6' 2.5\"), weight 70.761 kg (156 lb).  Body mass index is 19.77 kg/(m^2).     GENERAL:  Mr. Ospina is a pleasant  male who is well-groomed, well-developed and thin sitting comfortably in the exam room without any distress.  Affect is appropriate.     MOVEMENT DISORDERS ASSESSMENT: (Last Sinemet was about 1.5 hrs ago)  Speech: Normal.  Facial Expression: Flat.   Rest Tremor: Absent in all extremities.   Dystonia:  No noticeable dystonic posturing.   Action/Postural Tremor: Slight high-frequency low amplitude postural tremor bilaterally.  No action tremor.  Rigidity: Absent in all extremities.   Finger Taps: Normal.   Hand opening & closing: Normal.   Hand pronation & supination: Normal.   Leg Agility: Normal.   Arising from chair - arms folded across chest: Normal.  Posture: Normal erect.  Gait: Normal.  Postural Stability: Normal.  Body Bradykinesia: None.     ASSESSMENT/PLAN:     Mr. Ospina is a 29 year old  male with a diagnosis of dopamine responsive dystonia since 2005 who came to the clinic for a f/u visit.  Today's exam didn't show any dystonic posturing.  Pt reports that there is no progression in his motor symptoms.  Except for occasional leg cramping, he has no additional symptoms.  He " continues taking low dose Levodopa.     I reviewed notes & genetic testing as mentioned above, which were completed in 2005.  Since his leg cramping has responded to Levodopa, I'll refilled his Rx for 1 year.  He was informed to return to clinic in 1 year or sooner as needed.      The total time spent with the patient was 30 minutes, and greater than 50% of this time was spent in counseling and coordination of care.     Belkis Syed, APRN,  CNP  [facility]             ______________________________________      Patient was asked about 14 Review of systems including changes in vision (dry eyes, double vision), hearing, heart, lungs, musculoskeletal, depression, anxiety, snoring, RBD, insomnia, urinary frequency, urinary urgency, constipation, swallowing problems, hematological, ID, allergies, skin problems: seborrhea, endocrinological: thyroid, diabetes, cholesterol; balance, weight changes, and other neurological problems and these were not significant at this time except for   Patient Active Problem List   Diagnosis     DOPA responsive dystonia     Depression     Dystonia        No Known Allergies  Past Surgical History:   Procedure Laterality Date     NO HISTORY OF SURGERY       Past Medical History:   Diagnosis Date     Depression      DOPA responsive dystonia 11/1/2011     Social History     Socioeconomic History     Marital status: Single     Spouse name: Not on file     Number of children: Not on file     Years of education: Not on file     Highest education level: Not on file   Occupational History     Occupation: Inventory Management     Comment: [employer]   Social Needs     Financial resource strain: Not on file     Food insecurity:     Worry: Not on file     Inability: Not on file     Transportation needs:     Medical: Not on file     Non-medical: Not on file   Tobacco Use     Smoking status: Current Some Day Smoker     Smokeless tobacco: Never Used     Tobacco comment:  Quit in 2007. Smoked occasionally. Smoked for 6 months.   Substance and Sexual Activity     Alcohol use: Yes     Comment: Drinks occaionally.      Drug use: No     Sexual activity: Not on file   Lifestyle     Physical activity:     Days per week: Not on file     Minutes per session: Not on file     Stress: Not on file   Relationships     Social connections:     Talks on phone: Not on file     Gets together: Not on file     Attends Oriental orthodox service: Not on file     Active member of club or organization: Not on file     Attends meetings of clubs or organizations: Not on file     Relationship status: Not on file     Intimate partner violence:     Fear of current or ex partner: Not on file     Emotionally abused: Not on file     Physically abused: Not on file     Forced sexual activity: Not on file   Other Topics Concern     Parent/sibling w/ CABG, MI or angioplasty before 65F 55M? Not Asked   Social History Narrative    Quit smoking in 2007. He only smoked for 6 months occasionally. Drinks alcohol occaionally. Lives by himself in [facility].        Drug and lactation database from the [place] National Library of Medicine:  http://toxnet.nlm.nih.gov/cgi-bin/sis/htmlgen?LACT      B/P: Data Unavailable, T: Data Unavailable, P: Data Unavailable, R: Data Unavailable 0 lbs 0 oz  There were no vitals taken for this visit., There is no height or weight on file to calculate BMI.  Medications and Vitals not listed above were documented in the cart and reviewed by me.     Current Outpatient Medications   Medication Sig Dispense Refill     carbidopa-levodopa (SINEMET)  MG tablet 25/100 tab by mouth twice daily 180 tablet 3     carbidopa-levodopa (SINEMET)  MG tablet TAKE 1 TABLET BY MOUTH TWICE DAILY 60 tablet 1         Long Henry MD

## 2021-03-13 DIAGNOSIS — G24.1 DOPA RESPONSIVE DYSTONIA: ICD-10-CM

## 2021-03-15 RX ORDER — CARBIDOPA AND LEVODOPA 25; 100 MG/1; MG/1
TABLET ORAL
Qty: 180 TABLET | Refills: 0 | Status: SHIPPED | OUTPATIENT
Start: 2021-03-15 | End: 2021-06-11

## 2021-03-15 NOTE — TELEPHONE ENCOUNTER
Rx Authorization:    Requested Medication/ Dose CARBIDOPA/LEVODOPA 25-100MG TABS    Date last refill ordered: 2/15/2020    Quantity ordered: 180 tabs    # refills: 3    Date of last clinic visit with ordering provider: 3/3/2020    Date of next clinic visit with ordering provider:     All pertinent protocol data (lab date/result):     Include pertinent information from patients message:

## 2021-03-15 NOTE — TELEPHONE ENCOUNTER
Will send a 90 day supply to be signed by the provider with a note saying he needs to make an appointment with further refills.

## 2021-03-30 ENCOUNTER — MEDICAL CORRESPONDENCE (OUTPATIENT)
Dept: HEALTH INFORMATION MANAGEMENT | Facility: CLINIC | Age: 36
End: 2021-03-30

## 2021-04-06 ENCOUNTER — HOSPITAL ENCOUNTER (OUTPATIENT)
Facility: CLINIC | Age: 36
End: 2021-04-06
Attending: ORTHOPAEDIC SURGERY | Admitting: ORTHOPAEDIC SURGERY

## 2021-04-06 DIAGNOSIS — S83.289A: ICD-10-CM

## 2021-04-06 DIAGNOSIS — Z11.59 ENCOUNTER FOR SCREENING FOR OTHER VIRAL DISEASES: ICD-10-CM

## 2021-05-26 ENCOUNTER — RECORDS - HEALTHEAST (OUTPATIENT)
Dept: ADMINISTRATIVE | Facility: CLINIC | Age: 36
End: 2021-05-26

## 2021-06-10 RX ORDER — NYSTATIN 100000/ML
SUSPENSION, ORAL (FINAL DOSE FORM) ORAL
COMMUNITY
Start: 2020-12-16 | End: 2021-06-11

## 2021-06-10 RX ORDER — MEDROXYPROGESTERONE ACETATE 150 MG/ML
INJECTION, SUSPENSION INTRAMUSCULAR
COMMUNITY
Start: 2021-04-08 | End: 2021-06-11

## 2021-06-10 RX ORDER — BETAMETHASONE DIPROPIONATE 0.5 MG/G
CREAM TOPICAL
COMMUNITY
Start: 2020-07-14 | End: 2021-06-11

## 2021-06-10 RX ORDER — BETAMETHASONE DIPROPIONATE 0.05 %
OINTMENT (GRAM) TOPICAL
COMMUNITY
Start: 2020-09-09 | End: 2021-06-11

## 2021-06-10 RX ORDER — CLOBETASOL PROPIONATE 0.5 MG/G
CREAM TOPICAL DAILY PRN
COMMUNITY
Start: 2020-11-18

## 2021-06-10 RX ORDER — CLOTRIMAZOLE 1 %
CREAM (GRAM) TOPICAL
COMMUNITY
Start: 2020-07-14 | End: 2021-06-11

## 2021-06-10 RX ORDER — RIFAMPIN 300 MG/1
CAPSULE ORAL
COMMUNITY
Start: 2021-02-12 | End: 2021-06-11

## 2021-06-10 RX ORDER — MOMETASONE FUROATE 1 MG/G
CREAM TOPICAL DAILY PRN
COMMUNITY
Start: 2020-11-24

## 2021-06-10 RX ORDER — TRIAMCINOLONE ACETONIDE 1 MG/G
CREAM TOPICAL
COMMUNITY
Start: 2020-06-30 | End: 2021-06-11

## 2021-06-10 NOTE — PROGRESS NOTES
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 for 4 months from   Beginning of November till 2021   He had a TB test prior to getting enbrel and so had to get tb treatment prior to enbrel    COVID19 and covid19 related rash  May 6th 2021 diagnosed        Medications            Aspirin 325mg  1       Carbidopa/levodopa sinemet 25/100        Clobetasol temovate 0.05% external cream topical       Etanercept enbrel 50mg/ml autoinjector 7 days       Mometasone elocon 0.1% external cream topical                                                                                                                 Plan:      Will remain on sinemet 2/day    Discussed his covid19, psoriatic arthritis and TB treatments    He will return in one year or so.     Not clear that the dopa responsive dystonia is related to his right knee issue - h e has not noticed  asymmetric features with his movement problem more on the right but there has been some right shoulder issues as well. So cannnot exclude a biomechanical link with more problems with the right body thereby impacting more the right sided joints.     He has mychart that I set up today and down loaded the joel and got it working.     Coding statement:   Medical Decision Making:  #  Chronic progressive medical conditions addressed    Review and/or interpretation of unique test or documentation from a provider outside of neurology    Independent historian provided additional details   I  Prescription drug management and review of potential side effects and/or monitoring for side effects     Health impacted by social determinants of health      I have reviewed the note as documented above.  This accurately captures the substance of my conversation with the patient and total time spent preparing for visit, executing visit and completing visit on the day of the visit:  20 minutes.      Long Henry MD     ______________________________________    Last visit date and details:             ______________________________________      Patient was asked about 14 Review of systems including changes in vision (dry eyes, double vision), hearing, heart, lungs, musculoskeletal, depression, anxiety, snoring, RBD, insomnia, urinary frequency, urinary urgency, constipation, swallowing problems, hematological, ID, allergies, skin problems: seborrhea, endocrinological: thyroid, diabetes, cholesterol; balance, weight changes, and other neurological problems and these were not significant at this time except for   Patient Active Problem List   Diagnosis     DOPA responsive dystonia     Depression     Dystonia        No Known Allergies  Past Surgical History:   Procedure Laterality Date     NO HISTORY OF SURGERY       Past Medical History:   Diagnosis Date     Depression      DOPA responsive dystonia 11/1/2011     Social History      Socioeconomic History     Marital status: Single     Spouse name: Not on file     Number of children: Not on file     Years of education: Not on file     Highest education level: Not on file   Occupational History     Occupation: Inventory Management     Comment: [employer] - [employer]   Social Needs     Financial resource strain: Not on file     Food insecurity     Worry: Not on file     Inability: Not on file     Transportation needs     Medical: Not on file     Non-medical: Not on file   Tobacco Use     Smoking status: Current Some Day Smoker     Packs/day: 0.25     Smokeless tobacco: Never Used     Tobacco comment: Quit in 2007. Smoked occasionally. Smoked for 6 months.   Substance and Sexual Activity     Alcohol use: Yes     Comment: Drinks occaionally.      Drug use: Yes     Types: Marijuana     Comment: medical cannabis     Sexual activity: Not on file   Lifestyle     Physical activity     Days per week: Not on file     Minutes per session: Not on file     Stress: Not on file   Relationships     Social connections     Talks on phone: Not on file     Gets together: Not on file     Attends Anabaptism service: Not on file     Active member of club or organization: Not on file     Attends meetings of clubs or organizations: Not on file     Relationship status: Not on file     Intimate partner violence     Fear of current or ex partner: Not on file     Emotionally abused: Not on file     Physically abused: Not on file     Forced sexual activity: Not on file   Other Topics Concern     Parent/sibling w/ CABG, MI or angioplasty before 65F 55M? Not Asked   Social History Narrative    Quit smoking in 2007. He only smoked for 6 months occasionally. Drinks alcohol occaionally. Lives by himself in [facility].         Going        Drug and lactation database from the [place] National Library of Medicine:  http://toxnet.nlm.nih.gov/cgi-bin/sis/htmlgen?LACT      B/P: Data  Unavailable, T: Data Unavailable, P: Data Unavailable, R: Data Unavailable 0 lbs 0 oz  There were no vitals taken for this visit., There is no height or weight on file to calculate BMI.  Medications and Vitals not listed above were documented in the cart and reviewed by me.     Current Outpatient Medications   Medication Sig Dispense Refill     betamethasone dipropionate (DIPROSONE) 0.05 % external cream NYA EXT TO AFFECTED AREAS OF SCALP AND GROIN BID       betamethasone dipropionate (DIPROSONE) 0.05 % external ointment        carbidopa-levodopa (SINEMET)  MG tablet TAKE 1 TABLET BY MOUTH TWICE DAILY 180 tablet 0     clobetasol (TEMOVATE) 0.05 % external cream        clotrimazole (LOTRIMIN) 1 % external cream NYA TO SCALP AND GROIN BID UNTIL RESOLVED       ENBREL SURECLICK 50 MG/ML autoinjector        mometasone (ELOCON) 0.1 % external cream        nystatin (MYCOSTATIN) 995127 UNIT/ML suspension SWISH AND SWALLOW 5 ML BY MOUTH FOUR TIMES DAILY FOR 14 DAYS       rifampin (RIFADIN) 300 MG capsule TAKE 2 CAPSULES BY MOUTH EVERY DAY       triamcinolone (KENALOG) 0.1 % external cream NYA TOPICALLY AA IN LOWER LEG TID         Long Henry MD

## 2021-06-11 ENCOUNTER — OFFICE VISIT (OUTPATIENT)
Dept: NEUROLOGY | Facility: CLINIC | Age: 36
End: 2021-06-11
Payer: COMMERCIAL

## 2021-06-11 VITALS
SYSTOLIC BLOOD PRESSURE: 141 MMHG | DIASTOLIC BLOOD PRESSURE: 94 MMHG | BODY MASS INDEX: 22.87 KG/M2 | HEART RATE: 89 BPM | WEIGHT: 179.3 LBS | OXYGEN SATURATION: 96 % | RESPIRATION RATE: 16 BRPM

## 2021-06-11 DIAGNOSIS — G24.1 DOPA RESPONSIVE DYSTONIA: Primary | ICD-10-CM

## 2021-06-11 DIAGNOSIS — Z86.16 HISTORY OF COVID-19: ICD-10-CM

## 2021-06-11 DIAGNOSIS — L40.50 PSORIATIC ARTHRITIS (H): ICD-10-CM

## 2021-06-11 PROBLEM — E55.9 VITAMIN D DEFICIENCY, UNSPECIFIED: Status: ACTIVE | Noted: 2020-09-09

## 2021-06-11 PROBLEM — Z22.7 TB LUNG, LATENT: Status: ACTIVE | Noted: 2020-12-16

## 2021-06-11 PROBLEM — G24.9 DYSTONIA: Status: ACTIVE | Noted: 2018-06-19

## 2021-06-11 PROCEDURE — 99213 OFFICE O/P EST LOW 20 MIN: CPT | Performed by: PSYCHIATRY & NEUROLOGY

## 2021-06-11 RX ORDER — MEDROXYPROGESTERONE ACETATE 150 MG/ML
50 INJECTION, SUSPENSION INTRAMUSCULAR
COMMUNITY

## 2021-06-11 RX ORDER — HYDROCODONE BITARTRATE AND ACETAMINOPHEN 5; 325 MG/1; MG/1
TABLET ORAL
COMMUNITY
Start: 2021-04-22 | End: 2021-06-11

## 2021-06-11 RX ORDER — CARBIDOPA AND LEVODOPA 25; 100 MG/1; MG/1
TABLET ORAL
Qty: 180 TABLET | Refills: 3 | Status: SHIPPED | OUTPATIENT
Start: 2021-06-11 | End: 2022-04-05

## 2021-06-11 RX ORDER — LEVOCETIRIZINE DIHYDROCHLORIDE 5 MG/1
TABLET, FILM COATED ORAL
COMMUNITY
Start: 2021-06-10 | End: 2021-06-11

## 2021-06-11 ASSESSMENT — PAIN SCALES - GENERAL: PAINLEVEL: NO PAIN (1)

## 2021-06-11 NOTE — PATIENT INSTRUCTIONS
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 for 4 months from   Beginning of November till march 2021   He had a TB test prior to getting enbrel and so had to get tb treatment prior to enbrel    COVID19 and covid19 related rash  May 6th 2021 diagnosed        Medications            Aspirin 325mg  1       Carbidopa/levodopa sinemet 25/100        Clobetasol temovate 0.05% external cream topical       Etanercept enbrel 50mg/ml autoinjector 7 days       Mometasone elocon 0.1% external cream topical                                                                                                                 Plan:      Will remain on sinemet 2/day    Discussed his covid19, psoriatic arthritis and TB treatments    He will return in one year or so.     Not clear that the dopa responsive dystonia is related to his right knee issue - onel smith has not noticed asymmetric features with his movement problem more on the right but there has been some right shoulder issues as well. So cannnot exclude a biomechanical link with more problems with the right body thereby impacting more the right sided joints.     He has mychart that I set up today and  down loaded the joel and got it working.

## 2021-06-11 NOTE — LETTER
2021       RE: Tres Ospina  [address]  [place] [address]     Dear Colleague,    Thank you for referring your patient, Tres Ospina, to the [facility] [place] at [facility]. Please see a copy of my visit note below.        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 for 4 months from   Beginning of November till 2021   He had a TB test prior to getting enbrel and so had to get tb treatment prior to enbrel    COVID19 and covid19 related rash  May 6th 2021 diagnosed        Medications            Aspirin 325mg  1       Carbidopa/levodopa sinemet 25/100        Clobetasol temovate 0.05% external cream topical       Etanercept enbrel 50mg/ml autoinjector 7 days       Mometasone elocon 0.1% external cream topical                                                                                                                  Plan:      Will remain on sinemet 2/day    Discussed his covid19, psoriatic arthritis and TB treatments    He will return in one year or so.     Not clear that the dopa responsive dystonia is related to his right knee issue - onel smith has not noticed asymmetric features with his movement problem more on the right but there has been some right shoulder issues as well. So cannnot exclude a biomechanical link with more problems with the right body thereby impacting more the right sided joints.     He has mychart that I set up today and down loaded the joel and got it working.     Coding statement:   Medical Decision Making:  #  Chronic progressive medical conditions addressed    Review and/or interpretation of unique test or documentation from a provider outside of neurology    Independent historian provided additional details   I  Prescription drug management and review of potential side effects and/or monitoring for side effects     Health impacted by social determinants of health      I have reviewed the note as documented above.  This accurately captures the substance of my conversation with the patient and total time spent preparing for visit, executing visit and completing visit on the day of the visit:  20 minutes.      Long Henry MD     ______________________________________    Last visit date and details:             ______________________________________      Patient was asked about 14 Review of systems including changes in vision (dry eyes, double vision), hearing, heart, lungs, musculoskeletal, depression, anxiety, snoring, RBD, insomnia, urinary frequency, urinary urgency, constipation, swallowing problems, hematological, ID, allergies, skin problems: seborrhea, endocrinological: thyroid, diabetes, cholesterol; balance, weight changes, and other neurological problems and these were not significant at this time except for    Patient Active Problem List   Diagnosis     DOPA responsive dystonia     Depression     Dystonia        No Known Allergies  Past Surgical History:   Procedure Laterality Date     NO HISTORY OF SURGERY       Past Medical History:   Diagnosis Date     Depression      DOPA responsive dystonia 11/1/2011     Social History     Socioeconomic History     Marital status: Single     Spouse name: Not on file     Number of children: Not on file     Years of education: Not on file     Highest education level: Not on file   Occupational History     Occupation: Inventory Management     Comment: [employer] - [employer]   Social Needs     Financial resource strain: Not on file     Food insecurity     Worry: Not on file     Inability: Not on file     Transportation needs     Medical: Not on file     Non-medical: Not on file   Tobacco Use     Smoking status: Current Some Day Smoker     Packs/day: 0.25     Smokeless tobacco: Never Used     Tobacco comment: Quit in 2007. Smoked occasionally. Smoked for 6 months.   Substance and Sexual Activity     Alcohol use: Yes     Comment: Drinks occaionally.      Drug use: Yes     Types: Marijuana     Comment: medical cannabis     Sexual activity: Not on file   Lifestyle     Physical activity     Days per week: Not on file     Minutes per session: Not on file     Stress: Not on file   Relationships     Social connections     Talks on phone: Not on file     Gets together: Not on file     Attends Church service: Not on file     Active member of club or organization: Not on file     Attends meetings of clubs or organizations: Not on file     Relationship status: Not on file     Intimate partner violence     Fear of current or ex partner: Not on file     Emotionally abused: Not on file     Physically abused: Not on file     Forced sexual activity: Not on file   Other Topics Concern     Parent/sibling w/ CABG, MI or angioplasty before 65F 55M? Not Asked   Social History Narrative    Quit  smoking in 2007. He only smoked for 6 months occasionally. Drinks alcohol occaionally. Lives by himself in [facility] in [place].         Going        Drug and lactation database from the [place] National Library of Medicine:  http://toxnet.nlm.nih.gov/cgi-bin/sis/htmlgen?LACT      B/P: Data Unavailable, T: Data Unavailable, P: Data Unavailable, R: Data Unavailable 0 lbs 0 oz  There were no vitals taken for this visit., There is no height or weight on file to calculate BMI.  Medications and Vitals not listed above were documented in the cart and reviewed by me.     Current Outpatient Medications   Medication Sig Dispense Refill     betamethasone dipropionate (DIPROSONE) 0.05 % external cream NYA EXT TO AFFECTED AREAS OF SCALP AND GROIN BID       betamethasone dipropionate (DIPROSONE) 0.05 % external ointment        carbidopa-levodopa (SINEMET)  MG tablet TAKE 1 TABLET BY MOUTH TWICE DAILY 180 tablet 0     clobetasol (TEMOVATE) 0.05 % external cream        clotrimazole (LOTRIMIN) 1 % external cream NYA TO SCALP AND GROIN BID UNTIL RESOLVED       ENBREL SURECLICK 50 MG/ML autoinjector        mometasone (ELOCON) 0.1 % external cream        nystatin (MYCOSTATIN) 607478 UNIT/ML suspension SWISH AND SWALLOW 5 ML BY MOUTH FOUR TIMES DAILY FOR 14 DAYS       rifampin (RIFADIN) 300 MG capsule TAKE 2 CAPSULES BY MOUTH EVERY DAY       triamcinolone (KENALOG) 0.1 % external cream NYA TOPICALLY AA IN LOWER LEG TID         Long Henry MD

## 2021-06-11 NOTE — NURSING NOTE
Chief Complaint   Patient presents with     RECHECK     [facility] RETURN MOVEMENT DISORDER     Abdi Neal

## 2021-07-25 ENCOUNTER — HEALTH MAINTENANCE LETTER (OUTPATIENT)
Age: 36
End: 2021-07-25

## 2021-09-19 ENCOUNTER — HEALTH MAINTENANCE LETTER (OUTPATIENT)
Age: 36
End: 2021-09-19

## 2022-04-02 NOTE — PROGRESS NOTES
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  (primary encounter diagnosis)    Review of diagnosis    DRD    Carbidopa/levodopa Sinemet 25/100 @ 10am and 10pm  Has  Involuntary movements after some of the evening doses    Avoidance of dopamine blockers   Not taking    Review of surgical or medication options   reviewed    Gait/Balance/Falls   No falls      Exercise/Therapy performed/offered   Doing some exercise; walks dog    Cognitive/Driving   No cognitive problems  Driving bit    Mood   Panic attack while flight from [place] was messed up  He had some involuntary movements in feet and shoulders  A \"trigger\" in his body    Mood is \"pretty good\"  Unemployed presently  Living with rooming    Hallucinations/delusions   Sensation of water dripping    Sleep   jerkiness    Bladder   No change  Drinking     GI/Constipation/GERD   Elevated ALT (borderline)    ENDO   No clear problems    Cardio/heart   Had elevated blood pressure reading in chart    Vision   Wears glasses  uveitis    Heme   Not anemic  No elevated white blood cell count    Other:  Rosacea    tinnitus    Psoriatic arthritis - last feb 2020  Started with pain in his upper spine   Given prednisone and then developed psoriasis  Prior right knee injury 2015 volleyball  Developed right knee swelling after biking  surgery of right knee 4/22/2021     Rheumatologist  Dr. 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  Multilevel cervical spondylosis.   2.  No high-grade spinal canal stenosis. No abnormal cord signal.   3.  Moderate neural foraminal stenosis on the left at C3-C4 and on the right at C5-C6.         Medications      10a 10a   Aspirin 325mg  1     Augmented betamethasone dipropionate diprolene 0.05% cream     Calcipotriene Dovonox 0.005% ointment     Carbidopa/levodopa sinemet 25/100 1  1    Clobetasol temovate 0.05% external cream topical     Docosahexanoic acid EPA     Etanercept enbrel 50mg/ml autoinjector 7 days     Hydroxyzine atarax 25mg      Levocetirizine xyzal 5mg      Metronidazole metrocream 0.75% cream          Mometasone elocon 0.1% external cream topical     MVI        Naproxen naprosyn 500mg        Nystatin mycostatin 100,000 unit/ml susp       Pimecrolimus elidel 1% cream       Tacrolimus protopic 0.03% ointment       Terbinafine lamisil 1% cream                Plan:    Has some spinal midline symptoms that are jolt like during the night - last neck  Discussed that it is like a myoclonic symptom and not likely that is cervical spinal stenosis but discussed that cervical spine changes can occur in RA    Arthritis is well managed with enbrel in that he does not have any persistent pain    Thinks that the enbrel is not making his body feel right on the day of injection;  something has pinched his skin somewhere that is not where he gets an injection - side of head - spasm - and may be a spot where has psoriasis.    May be in legs and may be in check.     Because he is on a TNFalpha inhibitor and that it can cause demyelination would recommend getting a brain and cervical spine mri with and without contrast. If there are demyelinating changes would recommend a consultation with MS colleagues.     A pending ms consultation was placed.     May 4, 2021 had  "covid    Medical Decision Making:  #  Chronic progressive medical conditions addressed  RA   Review and/or interpretation of unique test or documentation from a provider outside of neurology yes   Independent historian provided additional details  no   Prescription drug management and review of potential side effects and/or monitoring for side effects  yes   Health impacted by social determinants of health  no    I have reviewed the note as documented above.  This accurately captures the substance of my conversation with the patient and total time spent preparing for visit, executing visit and completing visit on the day of the visit:  40 minutes. 310pm to 345pm face to face     Long Henry MD      ------------------------------------------------------------------------------------------------------------------------------------------------------------------------    Video-Visit Details    The patient has been notified of following:     \"After a review of the patient s situation, this visit was changed from an in-person visit to a video visit to reduce the risk of COVID-19 exposure.   The patient is being evaluated via a billable video visit.\"    \"This video visit will be conducted via a call between you and your physician/provider. We have found that certain health care needs can be provided without the need for an in-person physical exam.  This service lets us provide the care you need with a video conversation.  If a prescription is necessary we can send it directly to your pharmacy.  If lab work is needed we can place an order for that and you can then stop by our lab to have the test done at a later time.    If during the course of the call the physician/provider feels a video visit is not appropriate, you will not be charged for this service.\"     Patient has given verbal consent for Video visit? Yes    Patient would like the video invitation sent by:     Type of service:  Video Visit    Video Start Time: "     Video End Time (time video stopped):     Duration:  minutes - see above    Originating Location (pt. Location):     Distant Location (provider location):  [facility]UROLOG[facility] [place]     Mode of Communication:  Video Conference via [employer] (and if not possible then doximity)      Long Henry MD      --------------------------------------------------------------------------------------------------------------    Tres Ospina is a 36 year old male who is being evaluated via a billable video visit.      Charts reviewed  Consult from  Images reviewed        I have reviewed and updated the patient's Past Medical History, Social History, Family History and Medication List.    ALLERGIES  Patient has no known allergies.    Lasts visit details if there was a last visit:       14 Review of systems  are negative except for   Patient Active Problem List   Diagnosis     DOPA responsive dystonia     Depression     Dystonia     TB lung, latent     Vitamin D deficiency, unspecified     History of COVID-19     Psoriatic arthritis (H)      No Known Allergies  Past Surgical History:   Procedure Laterality Date     KNEE SURGERY Right 04/22/2021    meniscus removal and repair     Past Medical History:   Diagnosis Date     Depression      DOPA responsive dystonia 11/1/2011     History of COVID-19 6/11/2021     Psoriatic arthritis (H) 6/11/2021     Social History     Socioeconomic History     Marital status: Single     Spouse name: Not on file     Number of children: Not on file     Years of education: Not on file     Highest education level: Not on file   Occupational History     Occupation: Inventory Management     Comment: [employer]   Tobacco Use     Smoking status: Current Some Day Smoker     Packs/day: 0.25     Smokeless tobacco: Never Used     Tobacco comment: Quit in 2007. Smoked occasionally. Smoked for 6 months.   Substance and Sexual Activity     Alcohol use: Yes      Comment: Drinks occaionally.      Drug use: Yes     Types: Marijuana     Comment: medical cannabis     Sexual activity: Not on file   Other Topics Concern     Parent/sibling w/ CABG, MI or angioplasty before 65F 55M? Not Asked   Social History Narrative    Quit smoking in 2007. He only smoked for 6 months occasionally. Drinks alcohol occaionally. Lives by himself in [facility] in [place].         Going      Social Determinants of Health     Financial Resource Strain: Not on file   Food Insecurity: Not on file   Transportation Needs: Not on file   Physical Activity: Not on file   Stress: Not on file   Social Connections: Not on file   Intimate Partner Violence: Not on file   Housing Stability: Not on file     Family History   Problem Relation Age of Onset     Depression Mother      Hypertension Mother      Depression Father      Alcoholism Father      Depression Sister      Attention Deficit Disorder Sister         ADHD, Anxiety.     Other - See Comments Maternal Cousin      Current Outpatient Medications   Medication Sig Dispense Refill     aspirin (ASA) 325 MG EC tablet Take 325 mg by mouth daily       carbidopa-levodopa (SINEMET)  MG tablet TAKE 1 TABLET BY MOUTH TWICE DAILY 180 tablet 3     clobetasol (TEMOVATE) 0.05 % external cream        etanercept (ENBREL SURECLICK) 50 MG/ML autoinjector Inject 50 mg Subcutaneous every 7 days       mometasone (ELOCON) 0.1 % external cream

## 2022-04-05 ENCOUNTER — VIRTUAL VISIT (OUTPATIENT)
Dept: NEUROLOGY | Facility: CLINIC | Age: 37
End: 2022-04-05
Payer: COMMERCIAL

## 2022-04-05 DIAGNOSIS — G24.1 DOPA RESPONSIVE DYSTONIA: Primary | ICD-10-CM

## 2022-04-05 DIAGNOSIS — G25.3 MYOCLONUS: ICD-10-CM

## 2022-04-05 DIAGNOSIS — L40.50 PSORIATIC ARTHRITIS (H): ICD-10-CM

## 2022-04-05 DIAGNOSIS — G37.9 CENTRAL NERVOUS SYSTEM DEMYELINATING DISEASE (H): ICD-10-CM

## 2022-04-05 PROBLEM — H00.14 CHALAZION OF LEFT UPPER EYELID: Status: ACTIVE | Noted: 2022-01-26

## 2022-04-05 PROBLEM — H93.13 TINNITUS OF BOTH EARS: Status: ACTIVE | Noted: 2022-02-03

## 2022-04-05 PROCEDURE — 99215 OFFICE O/P EST HI 40 MIN: CPT | Mod: 95 | Performed by: PSYCHIATRY & NEUROLOGY

## 2022-04-05 RX ORDER — LEVOCETIRIZINE DIHYDROCHLORIDE 5 MG/1
5 TABLET, FILM COATED ORAL DAILY PRN
COMMUNITY
Start: 2021-07-14 | End: 2023-10-27

## 2022-04-05 RX ORDER — MULTIPLE VITAMINS W/ MINERALS TAB 9MG-400MCG
1 TAB ORAL DAILY
COMMUNITY

## 2022-04-05 RX ORDER — CALCIPOTRIENE 50 UG/G
OINTMENT TOPICAL
COMMUNITY
Start: 2021-11-29 | End: 2022-04-22

## 2022-04-05 RX ORDER — NYSTATIN 100000/ML
SUSPENSION, ORAL (FINAL DOSE FORM) ORAL
COMMUNITY
Start: 2021-12-22 | End: 2022-04-22

## 2022-04-05 RX ORDER — HYDROXYZINE HYDROCHLORIDE 25 MG/1
25 TABLET, FILM COATED ORAL EVERY 6 HOURS PRN
COMMUNITY
Start: 2021-11-10

## 2022-04-05 RX ORDER — NAPROXEN 500 MG/1
500 TABLET ORAL DAILY PRN
COMMUNITY
Start: 2020-10-20 | End: 2023-10-27

## 2022-04-05 RX ORDER — GABAPENTIN 300 MG/1
300 CAPSULE ORAL
Qty: 30 CAPSULE | Refills: 11 | Status: SHIPPED | OUTPATIENT
Start: 2022-04-05 | End: 2023-10-27

## 2022-04-05 RX ORDER — PIMECROLIMUS 10 MG/G
1 CREAM TOPICAL
COMMUNITY
Start: 2021-11-29 | End: 2022-04-22

## 2022-04-05 RX ORDER — CARBIDOPA AND LEVODOPA 25; 100 MG/1; MG/1
1 TABLET, EXTENDED RELEASE ORAL 2 TIMES DAILY
Qty: 60 TABLET | Refills: 11 | Status: SHIPPED | OUTPATIENT
Start: 2022-04-05 | End: 2022-04-24

## 2022-04-05 RX ORDER — TACROLIMUS 0.3 MG/G
1 OINTMENT TOPICAL
COMMUNITY
Start: 2021-12-02 | End: 2022-04-22

## 2022-04-05 RX ORDER — CARBIDOPA AND LEVODOPA 25; 100 MG/1; MG/1
TABLET ORAL
Qty: 180 TABLET | Refills: 3 | Status: SHIPPED | OUTPATIENT
Start: 2022-04-05 | End: 2023-06-20

## 2022-04-05 RX ORDER — PRENATAL VIT 91/IRON/FOLIC/DHA 28-975-200
COMBINATION PACKAGE (EA) ORAL
COMMUNITY
Start: 2020-09-08 | End: 2022-04-22

## 2022-04-05 RX ORDER — NYSTATIN 100000/ML
500000 SUSPENSION, ORAL (FINAL DOSE FORM) ORAL 4 TIMES DAILY
COMMUNITY
Start: 2021-11-10 | End: 2022-04-15

## 2022-04-05 RX ORDER — BETAMETHASONE DIPROPIONATE 0.5 MG/G
CREAM TOPICAL DAILY PRN
COMMUNITY
Start: 2022-02-09

## 2022-04-05 NOTE — PROGRESS NOTES
Justin is a 36 year old who is being evaluated via a billable video visit.      How would you like to obtain your AVS? Mychart  If the video visit is dropped, the invitation should be resent by: [phone]      Video Start Time:   Video-Visit Details    Type of service:  Video Visit    Video End Time:    Originating Location (pt. Location): Home    Distant Location (provider location):  [facility]     Platform used for Video Visit: [employer]

## 2022-04-05 NOTE — PATIENT INSTRUCTIONS
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  (primary encounter diagnosis)    Review of diagnosis    DRD    Carbidopa/levodopa Sinemet 25/100 @ 10am and 10pm  Has  Involuntary movements after some of the evening doses    Avoidance of dopamine blockers   Not taking    Review of surgical or medication options   reviewed    Gait/Balance/Falls   No falls      Exercise/Therapy performed/offered   Doing some exercise; walks dog    Cognitive/Driving   No cognitive problems  Driving bit    Mood   Panic attack while flight from [place] was messed up  He had some involuntary movements in feet and shoulders  A \"trigger\" in his body    Mood is \"pretty good\"  Unemployed presently  Living with rooming    Hallucinations/delusions   Sensation of water dripping    Sleep   jerkiness    Bladder   No change  Drinking     GI/Constipation/GERD   Elevated ALT (borderline)    ENDO   No clear problems    Cardio/heart   Had elevated blood pressure reading in chart    Vision   Wears glasses  uveitis    Heme   Not anemic  No elevated white blood cell count    Other:  Rosacea    tinnitus    Psoriatic arthritis - last feb 2020  Started with pain in his upper spine   Given prednisone and then developed psoriasis  Prior right knee injury 2015 volleyball  Developed right knee swelling after biking  surgery of right knee 4/22/2021     Rheumatologist  Dr. 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  Multilevel cervical spondylosis.   2.  No high-grade spinal canal stenosis. No abnormal cord signal. "   3.  Moderate neural foraminal stenosis on the left at C3-C4 and on the right at C5-C6.         Medications      10a 10a   Aspirin 325mg  1     Augmented betamethasone dipropionate diprolene 0.05% cream     Calcipotriene Dovonox 0.005% ointment     Carbidopa/levodopa sinemet 25/100 1  1    Clobetasol temovate 0.05% external cream topical     Docosahexanoic acid EPA     Etanercept enbrel 50mg/ml autoinjector 7 days     Hydroxyzine atarax 25mg      Levocetirizine xyzal 5mg      Metronidazole metrocream 0.75% cream          Mometasone elocon 0.1% external cream topical     MVI        Naproxen naprosyn 500mg        Nystatin mycostatin 100,000 unit/ml susp       Pimecrolimus elidel 1% cream       Tacrolimus protopic 0.03% ointment       Terbinafine lamisil 1% cream                Plan:    Has some spinal midline symptoms that are jolt like during the night - last neck  Discussed that it is like a myoclonic symptom and not likely that is cervical spinal stenosis but discussed that cervical spine changes can occur in RA    Arthritis is well managed with enbrel in that he does not have any persistent pain    Thinks that the enbrel is not making his body feel right on the day of injection;  something has pinched his skin somewhere that is not where he gets an injection - side of head - spasm - and may be a spot where has psoriasis.    May be in legs and may be in check.     Because he is on a TNFalpha inhibitor and that it can cause demyelination would recommend getting a brain and cervical spine mri with and without contrast. If there are demyelinating changes would recommend a consultation with MS colleagues.     A pending ms consultation was placed.     May 4, 2021 had covid

## 2022-04-05 NOTE — LETTER
"4/5/2022       RE: Tres Ospina  [address]  [place] [address]     Dear Colleague,    Thank you for referring your patient, Tres Ospina, to the [facility]UROLOG[facility] [place] at [facility]. Please see a copy of my visit note below.    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  (primary encounter diagnosis)    Review of diagnosis    DRD    Carbidopa/levodopa Sinemet 25/100 @ 10am and 10pm  Has  Involuntary movements after some of the evening doses    Avoidance of dopamine blockers   Not taking    Review of surgical or medication options   reviewed    Gait/Balance/Falls   No falls      Exercise/Therapy performed/offered   Doing some exercise; walks dog    Cognitive/Driving   No cognitive problems  Driving bit    Mood   Panic attack while flight from [place] was messed up  He had some involuntary movements in feet and shoulders  A \"trigger\" in his body    Mood is \"pretty good\"  Unemployed presently  Living with rooming    Hallucinations/delusions   Sensation of water dripping    Sleep   jerkiness    Bladder   No change  Drinking     GI/Constipation/GERD   Elevated ALT (borderline)    ENDO   No clear problems    Cardio/heart   Had elevated blood pressure reading in chart    Vision   Wears glasses  uveitis    Heme   Not anemic  No elevated white blood cell count    Other:  Rosacea    tinnitus    Psoriatic arthritis - last feb 2020  Started with pain in his upper spine   Given prednisone and then developed psoriasis  Prior right knee injury 2015 volleyball  Developed right knee swelling after biking  surgery of right knee 4/22/2021     Rheumatologist  Dr. 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  Multilevel cervical spondylosis.   2.  No high-grade spinal canal stenosis. No abnormal cord signal.   3.  Moderate neural foraminal stenosis on the left at C3-C4 and on the right at C5-C6.         Medications      10a 10a   Aspirin 325mg  1     Augmented betamethasone dipropionate diprolene 0.05% cream     Calcipotriene Dovonox 0.005% ointment     Carbidopa/levodopa sinemet 25/100 1  1    Clobetasol temovate 0.05% external cream topical     Docosahexanoic acid EPA     Etanercept enbrel 50mg/ml autoinjector 7 days     Hydroxyzine atarax 25mg      Levocetirizine xyzal 5mg      Metronidazole metrocream 0.75% cream          Mometasone elocon 0.1% external cream topical     MVI        Naproxen naprosyn 500mg        Nystatin mycostatin 100,000 unit/ml susp       Pimecrolimus elidel 1% cream       Tacrolimus protopic 0.03% ointment       Terbinafine lamisil 1% cream                Plan:    Has some spinal midline symptoms that are jolt like during the night - last neck  Discussed that it is like a myoclonic symptom and not likely that is cervical spinal stenosis but discussed that cervical spine changes can occur in RA    Arthritis is well managed with enbrel in that he does not have any persistent pain    Thinks that the enbrel is not making his body feel right on the day of injection;  something has pinched his skin somewhere that is not where he gets an injection - side of head - spasm - and may be a spot where has psoriasis.    May be in legs  "and may be in check.     Because he is on a TNFalpha inhibitor and that it can cause demyelination would recommend getting a brain and cervical spine mri with and without contrast. If there are demyelinating changes would recommend a consultation with MS colleagues.     A pending ms consultation was placed.     May 4, 2021 had covid    Medical Decision Making:  #  Chronic progressive medical conditions addressed  RA   Review and/or interpretation of unique test or documentation from a provider outside of neurology yes   Independent historian provided additional details  no   Prescription drug management and review of potential side effects and/or monitoring for side effects  yes   Health impacted by social determinants of health  no    I have reviewed the note as documented above.  This accurately captures the substance of my conversation with the patient and total time spent preparing for visit, executing visit and completing visit on the day of the visit:  40 minutes. 310pm to 345pm face to face     Long Henry MD      ------------------------------------------------------------------------------------------------------------------------------------------------------------------------    Video-Visit Details    The patient has been notified of following:     \"After a review of the patient s situation, this visit was changed from an in-person visit to a video visit to reduce the risk of COVID-19 exposure.   The patient is being evaluated via a billable video visit.\"    \"This video visit will be conducted via a call between you and your physician/provider. We have found that certain health care needs can be provided without the need for an in-person physical exam.  This service lets us provide the care you need with a video conversation.  If a prescription is necessary we can send it directly to your pharmacy.  If lab work is needed we can place an order for that and you can then stop by our lab to have the test done " "at a later time.    If during the course of the call the physician/provider feels a video visit is not appropriate, you will not be charged for this service.\"     Patient has given verbal consent for Video visit? Yes    Patient would like the video invitation sent by:     Type of service:  Video Visit    Video Start Time:     Video End Time (time video stopped):     Duration:  minutes - see above    Originating Location (pt. Location):     Distant Location (provider location):  [facility]UROLOG[facility] [place]     Mode of Communication:  Video Conference via [employer] (and if not possible then doximity)      Long Henry MD      --------------------------------------------------------------------------------------------------------------    Stevenbrent Ospina is a 36 year old male who is being evaluated via a billable video visit.      Charts reviewed  Consult from  Images reviewed        I have reviewed and updated the patient's Past Medical History, Social History, Family History and Medication List.    ALLERGIES  Patient has no known allergies.    Lasts visit details if there was a last visit:       14 Review of systems  are negative except for   Patient Active Problem List   Diagnosis     DOPA responsive dystonia     Depression     Dystonia     TB lung, latent     Vitamin D deficiency, unspecified     History of COVID-19     Psoriatic arthritis (H)      No Known Allergies  Past Surgical History:   Procedure Laterality Date     KNEE SURGERY Right 04/22/2021    meniscus removal and repair     Past Medical History:   Diagnosis Date     Depression      DOPA responsive dystonia 11/1/2011     History of COVID-19 6/11/2021     Psoriatic arthritis (H) 6/11/2021     Social History     Socioeconomic History     Marital status: Single     Spouse name: Not on file     Number of children: Not on file     Years of education: Not on file     Highest education level: Not on file   Occupational History     " Occupation: Inventory Management     Comment: [employer]   Tobacco Use     Smoking status: Current Some Day Smoker     Packs/day: 0.25     Smokeless tobacco: Never Used     Tobacco comment: Quit in 2007. Smoked occasionally. Smoked for 6 months.   Substance and Sexual Activity     Alcohol use: Yes     Comment: Drinks occaionally.      Drug use: Yes     Types: Marijuana     Comment: medical cannabis     Sexual activity: Not on file   Other Topics Concern     Parent/sibling w/ CABG, MI or angioplasty before 65F 55M? Not Asked   Social History Narrative    Quit smoking in 2007. He only smoked for 6 months occasionally. Drinks alcohol occaionally. Lives by himself in [facility] in [place].         Going      Social Determinants of Health     Financial Resource Strain: Not on file   Food Insecurity: Not on file   Transportation Needs: Not on file   Physical Activity: Not on file   Stress: Not on file   Social Connections: Not on file   Intimate Partner Violence: Not on file   Housing Stability: Not on file     Family History   Problem Relation Age of Onset     Depression Mother      Hypertension Mother      Depression Father      Alcoholism Father      Depression Sister      Attention Deficit Disorder Sister         ADHD, Anxiety.     Other - See Comments Maternal Cousin      Current Outpatient Medications   Medication Sig Dispense Refill     aspirin (ASA) 325 MG EC tablet Take 325 mg by mouth daily       carbidopa-levodopa (SINEMET)  MG tablet TAKE 1 TABLET BY MOUTH TWICE DAILY 180 tablet 3     clobetasol (TEMOVATE) 0.05 % external cream        etanercept (ENBREL SURECLICK) 50 MG/ML autoinjector Inject 50 mg Subcutaneous every 7 days       mometasone (ELOCON) 0.1 % external cream        Again, thank you for allowing me to participate in the care of your patient.      Sincerely,    Long Henry MD

## 2022-04-06 ENCOUNTER — MYC MEDICAL ADVICE (OUTPATIENT)
Dept: NEUROLOGY | Facility: CLINIC | Age: 37
End: 2022-04-06
Payer: COMMERCIAL

## 2022-04-06 DIAGNOSIS — L40.50 PSORIATIC ARTHRITIS (H): Primary | ICD-10-CM

## 2022-04-12 NOTE — PROGRESS NOTES
Diagnosis/Summary/Recommendations:    PATIENT: Tres Ospina  36 year old male     : 1985    SUNSHINE: 2022    MRN: [id number]    [address]   [place] [address]     [phone] (M)   [phone] (H)     Lives with room mate     [email or web address]    Has some spinal midline symptoms that are jolt like during the night - last neck  Discussed that it is like a myoclonic symptom and not likely that is cervical spinal stenosis but discussed that cervical spine changes can occur in RA     Arthritis is well managed with enbrel in that he does not have any persistent pain     Thinks that the enbrel is not making his body feel right on the day of injection;  something has pinched his skin somewhere that is not where he gets an injection - side of head - spasm - and may be a spot where has psoriasis.     May be in legs and may be in check.      Because he is on a TNFalpha inhibitor and that it can cause demyelination would recommend getting a brain and cervical spine mri with and without contrast. If there are demyelinating changes would recommend a consultation with MS colleagues.      A pending ms consultation was placed.       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  (primary encounter diagnosis)    Carbidopa/levodopa Sinemet 25/100 @ 10am and 10pm  Has  Involuntary movements after some of the evening doses  Tried CR and then went back on short acting 25/100 sinemet      Brain MRI 2022  Cervical MRI 2022    Scans were okay       Review of diagnosis    DRD    Avoidance of dopamine blockers       Motor complication review       Review of Impulse control disorders       Review of surgical or medication options       Gait/Balance/Falls       Exercise/Therapy performed/offered   Doing some exercise; walks dog       Cognitive/Driving   No cognitive problems  Driving bit    Mood   Panic attack while flight from [place] was messed up  He had some involuntary movements  "in feet and shoulders  A \"trigger\" in his body     Mood is \"pretty good\"  Unemployed presently  Living with rooming    Hallucinations/delusions   Sensation of water dripping    Sleep   jerkiness    Bladder/Renal/Prostate/Gyn/Other  No change    GI/Constipation/GERD   Elevated ALT (borderline)    ENDO/Lipid/DM/Bone density/Thyroid  No clear problems    Cardio/heart/Hyper or Hypotensive   Had elevated blood pressure reading in chart    Vision/Dry Eyes/Cataracts/Glaucoma/Macular   Wears glasses  Uveitis    Heme/Anticoagulation/Antiplatelet/Anemia/Other  Not anemic  No elevated white blood cell count    ENT/Resp    Skin/Cancer/Seborrhea/other  Rosacea    Musculoskeletal/Pain/Headache  Cervical Spine MRI 6/11/2020  1.  Multilevel cervical spondylosis.   2.  No high-grade spinal canal stenosis. No abnormal cord signal.   3.  Moderate neural foraminal stenosis on the left at C3-C4 and on the right at C5-C6.    Psoriatic arthritis - last feb 2020  Started with pain in his upper spine   Given prednisone and then developed psoriasis  Prior right knee injury 2015 volleyball  Developed right knee swelling after biking  surgery of right knee 4/22/2021     Rheumatologist  Dr. 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     Carbidopa/levodopa Sinemet 25/100  CR May try again    Carbidopa/levodopa sinemet 25/100 1  1    Clobetasol temovate " 0.05% external cream topical     Docosahexanoic acid EPA below      Etanercept enbrel 50mg/ml autoinjector 7 days     Fish oil - krill oil 1000mg 1 gram 1    Gabapentin neurontin 300mg  prn   Hydrocortisone 2.5% ointment See rx    Hydroxyzine atarax 25mg  prn      Levocetirizine xyzal 5mg  prn      Magnesium vitamin D supplement 250mg 1    Medical cannabis using    Metronidazole metrocream 0.75% cream  using     Mometasone elocon 0.1% external cream topical     MVI  1      Naproxen naprosyn 500mg  prn      Nystatin mycostatin 100,000 unit/ml susp  no     Pimecrolimus elidel 1% cream  no     Tacrolimus protopic 0.03% ointment  no     Terbinafine lamisil 1% cream   no                  Plan:    His symptoms of cramping are better  He  Is back on the short acting levodopa and may try the extended release again only in the morning using the 25/100 CR tablet    Reviewed his brain and neck mri which are fine.     Discussed reaching out to  [facility] via [employer]    He will be meeting with dr rossana figueroa rheumatology next week to review his TNF alpha inhibitor regimen    return      Coding statement:   Medical Decision Making:  #  Chronic progressive medical conditions addressed  - see above --   Review and/or interpretation of unique test or documentation from a provider outside of neurology scan   Independent historian provided additional details  no  Prescription drug management and review of potential side effects and/or monitoring for side effects  -- see above ---  Health impacted by social determinants of health  no    I have reviewed the note as documented above.  This accurately captures the substance of my conversation with the patient and total time spent preparing for visit, executing visit and completing visit on the day of the visit:  30 minutes.  The portion of this total time included face to face time 830-852am    Long Henry MD     ______________________________________    Last visit date and details:              ______________________________________      Patient was asked about 14 Review of systems including changes in vision (dry eyes, double vision), hearing, heart, lungs, musculoskeletal, depression, anxiety, snoring, RBD, insomnia, urinary frequency, urinary urgency, constipation, swallowing problems, hematological, ID, allergies, skin problems: seborrhea, endocrinological: thyroid, diabetes, cholesterol; balance, weight changes, and other neurological problems and these were not significant at this time except for   Patient Active Problem List   Diagnosis     DOPA responsive dystonia     Depression     Dystonia     TB lung, latent     Vitamin D deficiency, unspecified     History of COVID-19     Psoriatic arthritis (H)     Chalazion of left upper eyelid     Tinnitus of both ears        No Known Allergies  Past Surgical History:   Procedure Laterality Date     KNEE SURGERY Right 04/22/2021    meniscus removal and repair     Past Medical History:   Diagnosis Date     Depression      DOPA responsive dystonia 11/1/2011     History of COVID-19 6/11/2021     Psoriatic arthritis (H) 6/11/2021     Social History     Socioeconomic History     Marital status: Single     Spouse name: Not on file     Number of children: Not on file     Years of education: Not on file     Highest education level: Not on file   Occupational History     Occupation: Inventory Management     Comment: [employer]   Tobacco Use     Smoking status: Current Some Day Smoker     Packs/day: 0.25     Smokeless tobacco: Never Used     Tobacco comment: Quit in 2007. Smoked occasionally. Smoked for 6 months.   Substance and Sexual Activity     Alcohol use: Yes     Comment: Drinks occaionally.      Drug use: Yes     Types: Marijuana     Comment: medical cannabis     Sexual activity: Not on file   Other Topics Concern     Parent/sibling w/ CABG, MI or angioplasty before 65F 55M? Not Asked   Social History Narrative    Quit smoking  in 2007. He only smoked for 6 months occasionally. Drinks alcohol occaionally. Lives by himself in [facility] in [place].         Going      Social Determinants of Health     Financial Resource Strain: Not on file   Food Insecurity: Not on file   Transportation Needs: Not on file   Physical Activity: Not on file   Stress: Not on file   Social Connections: Not on file   Intimate Partner Violence: Not on file   Housing Stability: Not on file       Drug and lactation database from the [place] National Library of Medicine:  http://toxnet.nlm.nih.gov/cgi-bin/sis/htmlgen?LACT      B/P: Data Unavailable, T: Data Unavailable, P: Data Unavailable, R: Data Unavailable 0 lbs 0 oz  There were no vitals taken for this visit., There is no height or weight on file to calculate BMI.  Medications and Vitals not listed above were documented in the cart and reviewed by me.     Current Outpatient Medications   Medication Sig Dispense Refill     aspirin (ASA) 325 MG EC tablet Take 325 mg by mouth daily       augmented betamethasone dipropionate (DIPROLENE-AF) 0.05 % external cream        calcipotriene (DOVONOX) 0.005 % external ointment APPLY 1 APPLICATION TOPICALLY TWICE DAILY. APPLY TO AFFECTED PLAQUES ON WEEKENDS.       carbidopa-levodopa (SINEMET CR)  MG CR tablet Take 1 tablet by mouth 2 times daily 60 tablet 11     carbidopa-levodopa (SINEMET)  MG tablet TAKE 1 TABLET BY MOUTH TWICE DAILY 180 tablet 3     clobetasol (TEMOVATE) 0.05 % external cream        DOCOSAHEXAENOIC ACID-EPA PO daily.       etanercept (ENBREL SURECLICK) 50 MG/ML autoinjector Inject 50 mg Subcutaneous every 7 days       gabapentin (NEURONTIN) 300 MG capsule Take 1 capsule (300 mg) by mouth nightly as needed 30 capsule 11     hydrOXYzine (ATARAX) 25 MG tablet as needed.       levocetirizine (XYZAL) 5 MG tablet Take by mouth as needed.       metroNIDAZOLE (METROCREAM) 0.75 % external cream Apply topically to affected  area(s) 2 times daily.       mometasone (ELOCON) 0.1 % external cream        multivitamin w/minerals (MULTI-VITAMIN) tablet Take 1 tablet by mouth daily       naproxen (NAPROSYN) 500 MG tablet Take 500 mg by mouth daily as needed       nystatin (MYCOSTATIN) 615221 UNIT/ML suspension Take 500,000 Units by mouth 4 times daily       nystatin (MYCOSTATIN) 367009 UNIT/ML suspension SWISH AND SWALLOW 5 ML BY MOUTH FOUR TIMES DAILY       pimecrolimus (ELIDEL) 1 % external cream Apply 1 Application topically       tacrolimus (PROTOPIC) 0.03 % external ointment Apply 1 Application topically       terbinafine (LAMISIL) 1 % external cream            Long Henry MD

## 2022-04-22 ENCOUNTER — HOSPITAL ENCOUNTER (OUTPATIENT)
Dept: MRI IMAGING | Facility: CLINIC | Age: 37
Discharge: HOME | End: 2022-04-22
Attending: PSYCHIATRY & NEUROLOGY
Payer: COMMERCIAL

## 2022-04-22 ENCOUNTER — VIRTUAL VISIT (OUTPATIENT)
Dept: PHARMACY | Facility: CLINIC | Age: 37
End: 2022-04-22
Payer: COMMERCIAL

## 2022-04-22 DIAGNOSIS — F41.9 ANXIETY: ICD-10-CM

## 2022-04-22 DIAGNOSIS — Z78.9 TAKES DIETARY SUPPLEMENTS: ICD-10-CM

## 2022-04-22 DIAGNOSIS — L71.9 ROSACEA: ICD-10-CM

## 2022-04-22 DIAGNOSIS — G24.1 DOPA RESPONSIVE DYSTONIA: Primary | ICD-10-CM

## 2022-04-22 DIAGNOSIS — L40.50 PSORIATIC ARTHRITIS (H): ICD-10-CM

## 2022-04-22 DIAGNOSIS — L29.9 ITCHING: ICD-10-CM

## 2022-04-22 PROCEDURE — 72156 MRI NECK SPINE W/O & W/DYE: CPT

## 2022-04-22 PROCEDURE — 99607 MTMS BY PHARM ADDL 15 MIN: CPT | Performed by: PHARMACIST

## 2022-04-22 PROCEDURE — 99605 MTMS BY PHARM NP 15 MIN: CPT | Performed by: PHARMACIST

## 2022-04-22 PROCEDURE — 70553 MRI BRAIN STEM W/O & W/DYE: CPT

## 2022-04-22 PROCEDURE — A9585 GADOBUTROL INJECTION: HCPCS | Performed by: PSYCHIATRY & NEUROLOGY

## 2022-04-22 PROCEDURE — 255N000002 HC RX 255 OP 636: Performed by: PSYCHIATRY & NEUROLOGY

## 2022-04-22 RX ORDER — MULTIVITAMIN WITH IRON
1 TABLET ORAL
COMMUNITY

## 2022-04-22 RX ORDER — GADOBUTROL 604.72 MG/ML
7.5 INJECTION INTRAVENOUS ONCE
Status: COMPLETED | OUTPATIENT
Start: 2022-04-22 | End: 2022-04-22

## 2022-04-22 RX ORDER — CHLORAL HYDRATE 500 MG
1 CAPSULE ORAL DAILY PRN
COMMUNITY

## 2022-04-22 RX ADMIN — GADOBUTROL 7.5 ML: 604.72 INJECTION INTRAVENOUS at 07:08

## 2022-04-22 NOTE — PATIENT INSTRUCTIONS
"Recommendations from today's MTM visit:                                                    MTM (medication therapy management) is a service provided by a clinical pharmacist designed to help you get the most of out of your medicines.      1. We discussed that the carbidopa-levodopa extended-release tablets are a little less potent than the immediate-release tablets, so you could try using a combination of 1 of each of the tablets each day. Please discuss this with Dr. Henry.    2. Try to take carbidopa-levodopa one hour before or two hours after eating a meal for optimal absorption.     Follow-up: as needed    It was great speaking with you today.  I value your experience and would be very thankful for your time in providing feedback in our clinic survey. In the next few days, you may receive an email or text message from [employer] with a link to a survey related to your  clinical pharmacist.\"     To schedule another MTM appointment, please call the clinic directly or you may call the MTM scheduling line at [phone] or toll-free at [phone].     My Clinical Pharmacist's contact information:                                                      Please feel free to contact me with any questions or concerns you have.      Shannon Frazier, Pharm.D.  Medication Therapy Management Pharmacist  [facility]     "

## 2022-04-22 NOTE — PROGRESS NOTES
Medication Therapy Management (MTM) Encounter    ASSESSMENT:                            Medication Adherence/Access: No issues identified    Dopa-responsive dystonia: discussed the carbidopa-levodopa IR and CR formulations. The CR formulation is slightly less potent than IR and can be more erratic in its absorption. Could consider using a combination of these 2 formulations (ie: CR tablet in the morning and IR tablet in the evening). Awaiting MRI results and patient will follow up with Dr. Henry next week.     Psoriatic arthritis: Awaiting MRI results as therapy may change if demyelination is found on MRI. There have been reports of demyelination related to anti-TNF therapies, though this is rare.    Rosacea: stable     Anxiety: stable     Itching: stable     Supplements: stable     PLAN:                            1. We discussed that the carbidopa-levodopa extended-release tablets are a little less potent than the immediate-release tablets, so you could try using a combination of 1 of each of the tablets each day. Please discuss this with Dr. Henry.    2. Try to take carbidopa-levodopa one hour before or two hours after eating a meal for optimal absorption.     Follow-up: as needed    SUBJECTIVE/OBJECTIVE:                          Tres Ospina is a 36 year old male contacted via secure video for an initial visit. He was referred to me from Dr. Henry.      Reason for visit: medication review.    Allergies/ADRs: Reviewed in chart  Past Medical History: Reviewed in chart  Tobacco: He reports that he has been smoking. He has been smoking about 0.25 packs per day. He has never used smokeless tobacco.Tobacco Cessation Action Plan:   Information offered: Patient not interested at this time  Alcohol: has been drinking more this week as he has been stressed about the brain MRI but he is planning to stop drinking for a while now    Medication Adherence/Access: no issues reported    Dopa-responsive dystonia:  "Currently taking carbidopa-levodopa  mg twice daily. He reportedly tried carbidopa-levodopa  mg CR tablet at night (10-11 pm) and initially seemed to work really well but then it started wearing off by 6 pm and his foot would cramp up. He had less muscle spasms with the carbidopa-levodopa CR tablet but with switching back to the carbidopa-levodopa IR tablets now he is also not having muscle spasms. He preferred to take the medication once daily but likes how the IR works better. He does not endorse any side effects like nausea/dizziness but thinks the medication causes some drowsiness. Dr. Henry also prescribed gabapentin but patient states he hasn't used it as his \"nerve problems\" have been better. Of note, he had a brain MRI done today and is awaiting results.     Psoriatic arthritis: On Enbrel - injecting 50 mg about once weekly and naproxen as needed. He has been extending the interval longer than once weekly for Enbrel lately. He reports that Enbrel has caused a lot of issues and ideally he would rather be on a different medication. He has had to start and stop Enbrel a few times because he had covid and also had to be treated for latent TB. Now he is getting evaluated for possible demyelinating disease (hence, the MRI today). He is scheduled to see a new rheumatologist at [facility] in Sept 2022. Patient is using the following topically for his psoriasis:   - clobetasol- body  - mometasone- head/groin  - augmented clobetasol dipropionate- hands    Rosacea: Using metronidazole cream     Anxiety: Takes hydroxyzine as needed but hasn't used this in a long time    Itching: Was using antihistamines for itching on his feet but not currently using these     Supplements:   Multivitamin on days he doesn't eat enough fruits/vegetables  Magnesium supplement as needed   Omega3 fish oil most days     Today's Vitals: There were no vitals taken for this visit.  ----------------    I spent 18 minutes with " this patient today. I offer these suggestions for consideration by Dr. Henry. A copy of the visit note was provided to the patient's provider(s).    The patient was sent via [employer] a summary of these recommendations.     hSannon Frazier, PharmOnielD.  Medication Therapy Management Pharmacist  [facility]    Telemedicine Visit Details  Type of service:  Video Conference via Reading Room  Start Time: 1:04 PM  End Time: 1:22 PM  Originating Location (patient location): Home  Distant Location (provider location):  [facility]     Medication Therapy Recommendations  DOPA responsive dystonia    Current Medication: carbidopa-levodopa (SINEMET)  MG tablet   Rationale: Does not understand instructions - Adherence - Adherence   Recommendation: Provide Education   Status: Patient Agreed - Adherence/Education

## 2022-04-22 NOTE — Clinical Note
MRI was done today- awaiting results. We discussed the IR and CR forms of levodopa and he would like to discuss further with you at your appt with him next week. He initially liked the CR but 1 tab/day didn't cut it so he went back to 1  IR tab twice daily

## 2022-04-29 ENCOUNTER — OFFICE VISIT (OUTPATIENT)
Dept: NEUROLOGY | Facility: CLINIC | Age: 37
End: 2022-04-29
Payer: COMMERCIAL

## 2022-04-29 VITALS
WEIGHT: 172 LBS | SYSTOLIC BLOOD PRESSURE: 134 MMHG | DIASTOLIC BLOOD PRESSURE: 89 MMHG | BODY MASS INDEX: 21.94 KG/M2 | OXYGEN SATURATION: 94 % | RESPIRATION RATE: 16 BRPM | HEART RATE: 109 BPM

## 2022-04-29 DIAGNOSIS — G24.1 DOPA RESPONSIVE DYSTONIA: Primary | ICD-10-CM

## 2022-04-29 PROCEDURE — 99214 OFFICE O/P EST MOD 30 MIN: CPT | Performed by: PSYCHIATRY & NEUROLOGY

## 2022-04-29 RX ORDER — HYDROCORTISONE 25 MG/G
OINTMENT TOPICAL
COMMUNITY
Start: 2022-04-22

## 2022-04-29 RX ORDER — CARBIDOPA AND LEVODOPA 25; 100 MG/1; MG/1
TABLET, EXTENDED RELEASE ORAL
Qty: 60 TABLET | Refills: 11 | COMMUNITY
Start: 2022-04-29 | End: 2023-10-06

## 2022-04-29 NOTE — LETTER
2022       RE: Tres Ospina  [address]  [place] [address]     Dear Colleague,    Thank you for referring your patient, Tres Ospina, to the [facility]UROLOG[facility] [place] at [facility]. Please see a copy of my visit note below.        Diagnosis/Summary/Recommendations:    PATIENT: Tres Ospina  36 year old male     : 1985    SUNSHINE: 2022    MRN: [id number]    [address]   [place] [address]     [phone] (M)   [phone] (H)     Lives with room mate     [email or web address]    Has some spinal midline symptoms that are jolt like during the night - last neck  Discussed that it is like a myoclonic symptom and not likely that is cervical spinal stenosis but discussed that cervical spine changes can occur in RA     Arthritis is well managed with enbrel in that he does not have any persistent pain     Thinks that the enbrel is not making his body feel right on the day of injection;  something has pinched his skin somewhere that is not where he gets an injection - side of head - spasm - and may be a spot where has psoriasis.     May be in legs and may be in check.      Because he is on a TNFalpha inhibitor and that it can cause demyelination would recommend getting a brain and cervical spine mri with and without contrast. If there are demyelinating changes would recommend a consultation with MS colleagues.      A pending ms consultation was placed.       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  (primary encounter diagnosis)    Carbidopa/levodopa Sinemet 25/100 @ 10am and 10pm  Has  Involuntary movements after some of the evening doses  Tried CR and then went back on short acting 25/100 sinemet      Brain MRI 2022  Cervical MRI 2022    Scans were okay       Review of diagnosis    DRD    Avoidance of dopamine blockers       Motor complication review  "      Review of Impulse control disorders       Review of surgical or medication options       Gait/Balance/Falls       Exercise/Therapy performed/offered   Doing some exercise; walks dog       Cognitive/Driving   No cognitive problems  Driving bit    Mood   Panic attack while flight from [place] was messed up  He had some involuntary movements in feet and shoulders  A \"trigger\" in his body     Mood is \"pretty good\"  Unemployed presently  Living with rooming    Hallucinations/delusions   Sensation of water dripping    Sleep   jerkiness    Bladder/Renal/Prostate/Gyn/Other  No change    GI/Constipation/GERD   Elevated ALT (borderline)    ENDO/Lipid/DM/Bone density/Thyroid  No clear problems    Cardio/heart/Hyper or Hypotensive   Had elevated blood pressure reading in chart    Vision/Dry Eyes/Cataracts/Glaucoma/Macular   Wears glasses  Uveitis    Heme/Anticoagulation/Antiplatelet/Anemia/Other  Not anemic  No elevated white blood cell count    ENT/Resp    Skin/Cancer/Seborrhea/other  Rosacea    Musculoskeletal/Pain/Headache  Cervical Spine MRI 6/11/2020  1.  Multilevel cervical spondylosis.   2.  No high-grade spinal canal stenosis. No abnormal cord signal.   3.  Moderate neural foraminal stenosis on the left at C3-C4 and on the right at C5-C6.    Psoriatic arthritis - last feb 2020  Started with pain in his upper spine   Given prednisone and then developed psoriasis  Prior right knee injury 2015 volleyball  Developed right knee swelling after biking  surgery of right knee 4/22/2021     Rheumatologist  Dr. 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     Carbidopa/levodopa Sinemet 25/100  CR May try again    Carbidopa/levodopa sinemet 25/100 1  1    Clobetasol temovate 0.05% external cream topical     Docosahexanoic acid EPA below      Etanercept enbrel 50mg/ml autoinjector 7 days     Fish oil - krill oil 1000mg 1 gram 1    Gabapentin neurontin 300mg  prn   Hydrocortisone 2.5% ointment See rx    Hydroxyzine atarax 25mg  prn      Levocetirizine xyzal 5mg  prn      Magnesium vitamin D supplement 250mg 1    Medical cannabis using    Metronidazole metrocream 0.75% cream  using     Mometasone elocon 0.1% external cream topical     MVI  1      Naproxen naprosyn 500mg  prn      Nystatin mycostatin 100,000 unit/ml susp  no     Pimecrolimus elidel 1% cream  no     Tacrolimus protopic 0.03% ointment  no     Terbinafine lamisil 1% cream   no                  Plan:    His symptoms of cramping are better  He  Is back on the short acting levodopa and may try the extended release again only in the morning using the 25/100 CR tablet    Reviewed his brain and neck mri which are fine.     Discussed reaching out to  [employer] via [employer]    He will be meeting with dr rossana figueroa rheumatology next week to review his TNF alpha inhibitor regimen    return      Coding statement:   Medical Decision Making:  #  Chronic progressive medical conditions addressed  - see above --   Review and/or interpretation of unique test or documentation from a provider outside of neurology scan   Independent historian provided additional details  no  Prescription drug management and review of potential side effects and/or monitoring for side effects  -- see above ---  Health impacted by social determinants of health  no    I have reviewed the note as documented above.  This  accurately captures the substance of my conversation with the patient and total time spent preparing for visit, executing visit and completing visit on the day of the visit:  30 minutes.  The portion of this total time included face to face time 830-852am    Long Henry MD     ______________________________________    Last visit date and details:             ______________________________________      Patient was asked about 14 Review of systems including changes in vision (dry eyes, double vision), hearing, heart, lungs, musculoskeletal, depression, anxiety, snoring, RBD, insomnia, urinary frequency, urinary urgency, constipation, swallowing problems, hematological, ID, allergies, skin problems: seborrhea, endocrinological: thyroid, diabetes, cholesterol; balance, weight changes, and other neurological problems and these were not significant at this time except for   Patient Active Problem List   Diagnosis     DOPA responsive dystonia     Depression     Dystonia     TB lung, latent     Vitamin D deficiency, unspecified     History of COVID-19     Psoriatic arthritis (H)     Chalazion of left upper eyelid     Tinnitus of both ears        No Known Allergies  Past Surgical History:   Procedure Laterality Date     KNEE SURGERY Right 04/22/2021    meniscus removal and repair     Past Medical History:   Diagnosis Date     Depression      DOPA responsive dystonia 11/1/2011     History of COVID-19 6/11/2021     Psoriatic arthritis (H) 6/11/2021     Social History     Socioeconomic History     Marital status: Single     Spouse name: Not on file     Number of children: Not on file     Years of education: Not on file     Highest education level: Not on file   Occupational History     Occupation: [employer]     Comment: [employer]   Tobacco Use     Smoking status: Current Some Day Smoker     Packs/day: 0.25     Smokeless tobacco: Never Used     Tobacco comment: Quit in 2007. Smoked occasionally.  Smoked for 6 months.   Substance and Sexual Activity     Alcohol use: Yes     Comment: Drinks occaionally.      Drug use: Yes     Types: Marijuana     Comment: medical cannabis     Sexual activity: Not on file   Other Topics Concern     Parent/sibling w/ CABG, MI or angioplasty before 65F 55M? Not Asked   Social History Narrative    Quit smoking in 2007. He only smoked for 6 months occasionally. Drinks alcohol occaionally. Lives by himself in [place] in [place].         Going      Social Determinants of Health     Financial Resource Strain: Not on file   Food Insecurity: Not on file   Transportation Needs: Not on file   Physical Activity: Not on file   Stress: Not on file   Social Connections: Not on file   Intimate Partner Violence: Not on file   Housing Stability: Not on file       Drug and lactation database from the [place] National Library of Medicine:  http://toxnet.nlm.nih.gov/cgi-bin/sis/htmlgen?LACT      B/P: Data Unavailable, T: Data Unavailable, P: Data Unavailable, R: Data Unavailable 0 lbs 0 oz  There were no vitals taken for this visit., There is no height or weight on file to calculate BMI.  Medications and Vitals not listed above were documented in the cart and reviewed by me.     Current Outpatient Medications   Medication Sig Dispense Refill     aspirin (ASA) 325 MG EC tablet Take 325 mg by mouth daily       augmented betamethasone dipropionate (DIPROLENE-AF) 0.05 % external cream        calcipotriene (DOVONOX) 0.005 % external ointment APPLY 1 APPLICATION TOPICALLY TWICE DAILY. APPLY TO AFFECTED PLAQUES ON WEEKENDS.       carbidopa-levodopa (SINEMET CR)  MG CR tablet Take 1 tablet by mouth 2 times daily 60 tablet 11     carbidopa-levodopa (SINEMET)  MG tablet TAKE 1 TABLET BY MOUTH TWICE DAILY 180 tablet 3     clobetasol (TEMOVATE) 0.05 % external cream        DOCOSAHEXAENOIC ACID-EPA PO daily.       etanercept (ENBREL SURECLICK) 50 MG/ML autoinjector  Inject 50 mg Subcutaneous every 7 days       gabapentin (NEURONTIN) 300 MG capsule Take 1 capsule (300 mg) by mouth nightly as needed 30 capsule 11     hydrOXYzine (ATARAX) 25 MG tablet as needed.       levocetirizine (XYZAL) 5 MG tablet Take by mouth as needed.       metroNIDAZOLE (METROCREAM) 0.75 % external cream Apply topically to affected area(s) 2 times daily.       mometasone (ELOCON) 0.1 % external cream        multivitamin w/minerals (MULTI-VITAMIN) tablet Take 1 tablet by mouth daily       naproxen (NAPROSYN) 500 MG tablet Take 500 mg by mouth daily as needed       nystatin (MYCOSTATIN) 147084 UNIT/ML suspension Take 500,000 Units by mouth 4 times daily       nystatin (MYCOSTATIN) 541115 UNIT/ML suspension SWISH AND SWALLOW 5 ML BY MOUTH FOUR TIMES DAILY       pimecrolimus (ELIDEL) 1 % external cream Apply 1 Application topically       tacrolimus (PROTOPIC) 0.03 % external ointment Apply 1 Application topically       terbinafine (LAMISIL) 1 % external cream            Long Henry MD

## 2022-04-29 NOTE — PATIENT INSTRUCTIONS
Medications      10a 10a   Aspirin 325mg  no     Augmented betamethasone dipropionate diprolene 0.05% cream  prn     Calcipotriene Dovonox 0.005% ointment  ?     Carbidopa/levodopa Sinemet 25/100  CR May try again    Carbidopa/levodopa sinemet 25/100 1  1    Clobetasol temovate 0.05% external cream topical     Docosahexanoic acid EPA below      Etanercept enbrel 50mg/ml autoinjector 7 days     Fish oil - krill oil 1000mg 1 gram 1    Gabapentin neurontin 300mg  prn   Hydrocortisone 2.5% ointment See rx    Hydroxyzine atarax 25mg  prn      Levocetirizine xyzal 5mg  prn      Magnesium vitamin D supplement 250mg 1    Medical cannabis using    Metronidazole metrocream 0.75% cream  using     Mometasone elocon 0.1% external cream topical     MVI  1      Naproxen naprosyn 500mg  prn      Nystatin mycostatin 100,000 unit/ml susp  no     Pimecrolimus elidel 1% cream  no     Tacrolimus protopic 0.03% ointment  no     Terbinafine lamisil 1% cream   no                  Plan:    His symptoms of cramping are better  He  Is back on the short acting levodopa and may try the extended release again only in the morning using the 25/100 CR tablet    Reviewed his brain and neck mri which are fine.     Discussed reaching out to  Us via [employer]    He will be meeting with dr rossana figueroa rheumatology next week to review his TNF alpha inhibitor regimen    return

## 2022-06-19 DIAGNOSIS — L40.50 PSORIATIC ARTHRITIS (H): ICD-10-CM

## 2022-06-19 DIAGNOSIS — G24.1 DOPA RESPONSIVE DYSTONIA: ICD-10-CM

## 2022-06-20 RX ORDER — CARBIDOPA AND LEVODOPA 25; 100 MG/1; MG/1
TABLET ORAL
Qty: 180 TABLET | Refills: 3 | OUTPATIENT
Start: 2022-06-20

## 2022-06-20 NOTE — TELEPHONE ENCOUNTER
Carbidopa/levodopa  was refilled on 4/5/2022 for 90 days with 3 refills. No new prescription needed.

## 2022-08-11 NOTE — TELEPHONE ENCOUNTER
NOTES Status Details   OFFICE NOTE from referring provider Internal 04.06.2022 Long Henry MD   OFFICE NOTE from other specialist Internal 04.22.2022    Shannon Frazier, [facility]          DISCHARGE SUMMARY from hospital     DISCHARGE REPORT from the ER     MEDICATION LIST Internal    LABS (Any and all labs)      Internal    Biopsy/pathology (Anything related to diagnoses I.e. fluid aspirations, lip biopsy, muscle biopsy)               Imaging (All imaging related to diagnoses)     Echo     HRCT     CXR     EMG                    Scleroderma/Dermatomyositis diagnoses     Previous Cardiology notes      Previous Pulmonary notes     Previous Dermatology notes     Previous GI notes     Lupus diagnoses     Previous Nephrology notes     Previous Dermatology notes     Previous Cardiology notes

## 2022-08-21 ENCOUNTER — HEALTH MAINTENANCE LETTER (OUTPATIENT)
Age: 37
End: 2022-08-21

## 2022-09-02 ENCOUNTER — PRE VISIT (OUTPATIENT)
Dept: RHEUMATOLOGY | Facility: CLINIC | Age: 37
End: 2022-09-02

## 2022-11-21 ENCOUNTER — HEALTH MAINTENANCE LETTER (OUTPATIENT)
Age: 37
End: 2022-11-21

## 2022-12-23 ENCOUNTER — HOSPITAL ENCOUNTER (EMERGENCY)
Facility: CLINIC | Age: 37
Discharge: HOME | End: 2022-12-23
Attending: EMERGENCY MEDICINE | Admitting: EMERGENCY MEDICINE
Payer: COMMERCIAL

## 2022-12-23 VITALS
HEART RATE: 83 BPM | TEMPERATURE: 98.2 F | SYSTOLIC BLOOD PRESSURE: 118 MMHG | OXYGEN SATURATION: 98 % | RESPIRATION RATE: 18 BRPM | DIASTOLIC BLOOD PRESSURE: 101 MMHG

## 2022-12-23 DIAGNOSIS — R20.2 PARESTHESIAS: ICD-10-CM

## 2022-12-23 LAB
ANION GAP SERPL CALCULATED.3IONS-SCNC: 16 MMOL/L (ref 7–15)
BASOPHILS # BLD AUTO: 0.1 10E3/UL (ref 0–0.2)
BASOPHILS NFR BLD AUTO: 1 %
BUN SERPL-MCNC: 8.1 MG/DL (ref 6–20)
CALCIUM SERPL-MCNC: 9.2 MG/DL (ref 8.6–10)
CHLORIDE SERPL-SCNC: 104 MMOL/L (ref 98–107)
CREAT SERPL-MCNC: 0.79 MG/DL (ref 0.67–1.17)
DEPRECATED HCO3 PLAS-SCNC: 21 MMOL/L (ref 22–29)
EOSINOPHIL # BLD AUTO: 0.2 10E3/UL (ref 0–0.7)
EOSINOPHIL NFR BLD AUTO: 3 %
ERYTHROCYTE [DISTWIDTH] IN BLOOD BY AUTOMATED COUNT: 14 % (ref 10–15)
FOLATE SERPL-MCNC: 8.3 NG/ML (ref 4.6–34.8)
GFR SERPL CREATININE-BSD FRML MDRD: >90 ML/MIN/1.73M2
GLUCOSE SERPL-MCNC: 118 MG/DL (ref 70–99)
HCT VFR BLD AUTO: 38.5 % (ref 40–53)
HGB BLD-MCNC: 13.2 G/DL (ref 13.3–17.7)
HOLD SPECIMEN: NORMAL
IMM GRANULOCYTES # BLD: 0 10E3/UL
IMM GRANULOCYTES NFR BLD: 0 %
LYMPHOCYTES # BLD AUTO: 2.5 10E3/UL (ref 0.8–5.3)
LYMPHOCYTES NFR BLD AUTO: 34 %
MCH RBC QN AUTO: 35 PG (ref 26.5–33)
MCHC RBC AUTO-ENTMCNC: 34.3 G/DL (ref 31.5–36.5)
MCV RBC AUTO: 102 FL (ref 78–100)
MONOCYTES # BLD AUTO: 0.5 10E3/UL (ref 0–1.3)
MONOCYTES NFR BLD AUTO: 7 %
NEUTROPHILS # BLD AUTO: 4.1 10E3/UL (ref 1.6–8.3)
NEUTROPHILS NFR BLD AUTO: 55 %
NRBC # BLD AUTO: 0 10E3/UL
NRBC BLD AUTO-RTO: 0 /100
PLATELET # BLD AUTO: 285 10E3/UL (ref 150–450)
POTASSIUM SERPL-SCNC: 4.2 MMOL/L (ref 3.4–5.3)
RBC # BLD AUTO: 3.77 10E6/UL (ref 4.4–5.9)
SODIUM SERPL-SCNC: 141 MMOL/L (ref 136–145)
VIT B12 SERPL-MCNC: 3114 PG/ML (ref 232–1245)
WBC # BLD AUTO: 7.4 10E3/UL (ref 4–11)

## 2022-12-23 PROCEDURE — 36415 COLL VENOUS BLD VENIPUNCTURE: CPT | Performed by: EMERGENCY MEDICINE

## 2022-12-23 PROCEDURE — 80048 BASIC METABOLIC PNL TOTAL CA: CPT | Performed by: EMERGENCY MEDICINE

## 2022-12-23 PROCEDURE — 85025 COMPLETE CBC W/AUTO DIFF WBC: CPT | Performed by: EMERGENCY MEDICINE

## 2022-12-23 PROCEDURE — 82746 ASSAY OF FOLIC ACID SERUM: CPT | Performed by: EMERGENCY MEDICINE

## 2022-12-23 PROCEDURE — 82607 VITAMIN B-12: CPT | Performed by: EMERGENCY MEDICINE

## 2022-12-23 PROCEDURE — 99283 EMERGENCY DEPT VISIT LOW MDM: CPT | Performed by: EMERGENCY MEDICINE

## 2022-12-23 ASSESSMENT — ENCOUNTER SYMPTOMS
WEAKNESS: 0
VOMITING: 0
RHINORRHEA: 0
DYSURIA: 0
BRUISES/BLEEDS EASILY: 0
BACK PAIN: 0
NAUSEA: 0
FEVER: 0
SHORTNESS OF BREATH: 0
NUMBNESS: 1
ABDOMINAL PAIN: 0
CONFUSION: 0

## 2022-12-23 ASSESSMENT — ACTIVITIES OF DAILY LIVING (ADL)
ADLS_ACUITY_SCORE: 35
ADLS_ACUITY_SCORE: 35

## 2022-12-23 NOTE — ED PROVIDER NOTES
ED Provider Note  [facility]      History     Chief Complaint   Patient presents with     Numbness     HPI  Tres Ospina is a 37 year old male who the past medical history of polysubstance abuse who presents to the emergency department for evaluation of tingling and numbness.  Patient reports that approximately 2 weeks ago he started on a saab.  Patient reports using ketamine, nitrous, cocaine and stopped using approximately 1 weeks ago.  Patient states that for the past 1 week he has had some tingling and numbness in his bilateral hands, bilateral feet, and also having some erectile dysfunction.  Patient reports that he is able to get an erection at times but states difficulty getting spontaneous erection or erection during masturbation.  Patient reports friend had similar cyst in the past and at that point was related to vitamin B 12 deficiency so patient concern that he may be deficient due to his drug use.  Patient reports taking oral vitamin B12 supplements over the past week.  Patient denies any trauma, denies any injury.  Denies any headache, fever, chills, nausea, vomiting, chest pain, shortness of breath, abdominal pain.  Patient does report concern for dehydration, no other complaints.    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     History of COVID-19 6/11/2021     Psoriatic arthritis (H) 6/11/2021     Past Surgical History:   Procedure Laterality Date     KNEE SURGERY Right 04/22/2021    meniscus removal and repair     augmented betamethasone dipropionate (DIPROLENE-AF) 0.05 % external cream  carbidopa-levodopa (SINEMET CR)  MG CR tablet  carbidopa-levodopa (SINEMET)  MG tablet  clobetasol (TEMOVATE) 0.05 % external cream  etanercept (ENBREL SURECLICK) 50 MG/ML autoinjector  fish oil-omega-3 fatty acids 1000 MG capsule  gabapentin (NEURONTIN) 300 MG capsule  hydrocortisone 2.5 %  ointment  hydrOXYzine (ATARAX) 25 MG tablet  levocetirizine (XYZAL) 5 MG tablet  magnesium 250 MG tablet  medical cannabis (Patient's own supply)  metroNIDAZOLE (METROCREAM) 0.75 % external cream  mometasone (ELOCON) 0.1 % external cream  multivitamin w/minerals (THERA-VIT-M) tablet  naproxen (NAPROSYN) 500 MG tablet      No Known Allergies  Family History  Family History   Problem Relation Age of Onset     Depression Mother      Hypertension Mother      Depression Father      Alcoholism Father      Depression Sister      Attention Deficit Disorder Sister         ADHD, Anxiety.     Other - See Comments Maternal Cousin      Social History   Social History     Tobacco Use     Smoking status: Some Days     Packs/day: 0.25     Types: Cigarettes     Smokeless tobacco: Never     Tobacco comments:     Quit in 2007. Smoked occasionally. Smoked for 6 months.   Substance Use Topics     Alcohol use: Yes     Comment: Drinks occaionally.      Drug use: Yes     Types: Marijuana     Comment: medical cannabis      Past medical history, past surgical history, medications, allergies, family history, and social history were reviewed with the patient. No additional pertinent items.       Review of Systems   Constitutional: Negative for fever.   HENT: Negative for rhinorrhea.    Eyes: Negative for visual disturbance.   Respiratory: Negative for shortness of breath.    Cardiovascular: Negative for chest pain.   Gastrointestinal: Negative for abdominal pain, nausea and vomiting.   Endocrine: Negative for polyuria.   Genitourinary: Negative for dysuria.   Musculoskeletal: Negative for back pain.   Skin: Negative for rash.   Allergic/Immunologic: Negative for immunocompromised state.   Neurological: Positive for numbness. Negative for weakness.   Hematological: Does not bruise/bleed easily.   Psychiatric/Behavioral: Negative for confusion.     A complete review of systems was performed with pertinent positives and negatives noted in the  HPI, and all other systems negative.    Physical Exam   BP: (!) 146/90  Pulse: 105  Temp: 98.2  F (36.8  C)  Resp: 18  SpO2: 98 %  Physical Exam  General: Afebrile, no acute distress   HEENT: Normocephalic, atraumatic, conjunctivae normal. MMM  Neck: non-tender, supple  Cardio: regular rate. regular rhythm   Resp: Normal work of breathing, no respiratory distress, lungs clear bilaterally, no wheezing, rhonchi, rales  Chest/Back: no visual signs of trauma, no CVA tenderness   Abdomen: soft, non distension, no tenderness, no peritoneal signs   Neuro: alert and fully oriented. CN II-XII grossly intact. Normal strength, +paresthesia in b/l hand and feet  MSK: no deformities. Normal range of motion  Integumentary/Skin: no rash visualized, normal color  Psych: normal affect, normal behavior    ED Course      Procedures    Results for orders placed or performed during the hospital encounter of 12/23/22   Basic metabolic panel     Status: Abnormal   Result Value Ref Range    Sodium 141 136 - 145 mmol/L    Potassium 4.2 3.4 - 5.3 mmol/L    Chloride 104 98 - 107 mmol/L    Carbon Dioxide (CO2) 21 (L) 22 - 29 mmol/L    Anion Gap 16 (H) 7 - 15 mmol/L    Urea Nitrogen 8.1 6.0 - 20.0 mg/dL    Creatinine 0.79 0.67 - 1.17 mg/dL    Calcium 9.2 8.6 - 10.0 mg/dL    Glucose 118 (H) 70 - 99 mg/dL    GFR Estimate >90 >60 mL/min/1.73m2   CBC with platelets and differential     Status: Abnormal   Result Value Ref Range    WBC Count 7.4 4.0 - 11.0 10e3/uL    RBC Count 3.77 (L) 4.40 - 5.90 10e6/uL    Hemoglobin 13.2 (L) 13.3 - 17.7 g/dL    Hematocrit 38.5 (L) 40.0 - 53.0 %     (H) 78 - 100 fL    MCH 35.0 (H) 26.5 - 33.0 pg    MCHC 34.3 31.5 - 36.5 g/dL    RDW 14.0 10.0 - 15.0 %    Platelet Count 285 150 - 450 10e3/uL    % Neutrophils 55 %    % Lymphocytes 34 %    % Monocytes 7 %    % Eosinophils 3 %    % Basophils 1 %    % Immature Granulocytes 0 %    NRBCs per 100 WBC 0 <1 /100    Absolute Neutrophils 4.1 1.6 - 8.3 10e3/uL    Absolute  Lymphocytes 2.5 0.8 - 5.3 10e3/uL    Absolute Monocytes 0.5 0.0 - 1.3 10e3/uL    Absolute Eosinophils 0.2 0.0 - 0.7 10e3/uL    Absolute Basophils 0.1 0.0 - 0.2 10e3/uL    Absolute Immature Granulocytes 0.0 <=0.4 10e3/uL    Absolute NRBCs 0.0 10e3/uL   Vitamin B12     Status: Abnormal   Result Value Ref Range    Vitamin B12 3,114 (H) 232 - 1,245 pg/mL   Folate     Status: Normal   Result Value Ref Range    Folic Acid 8.3 4.6 - 34.8 ng/mL   Extra Tube     Status: None    Narrative    The following orders were created for panel order Extra Tube.  Procedure                               Abnormality         Status                     ---------                               -----------         ------                     Extra Red Top Tube[[id number]]                               Final result                 Please view results for these tests on the individual orders.   Extra Red Top Tube     Status: None   Result Value Ref Range    Hold Specimen [facility]    CBC with platelets differential     Status: Abnormal    Narrative    The following orders were created for panel order CBC with platelets differential.  Procedure                               Abnormality         Status                     ---------                               -----------         ------                     CBC with platelets and d...[[id number]]  Abnormal            Final result                 Please view results for these tests on the individual orders.     Medications - No data to display     Assessments & Plan (with Medical Decision Making)   Tres Ospina is a 37 year old male who the past medical history of polysubstance abuse who presents to the emergency department for evaluation of tingling and numbness after being on a polysubstance use saab.  Upon arrival patient is nontoxic-appearing, afebrile, no distress.  Patient here with polysubstance use, now with tingling and numbness in his bilateral hands and feet, patient has been taking  oral B12 supplements at home.  Upon arrival IV was established, patient was hydrated with 1 L normal saline IV fluid bolus, comprehensive labs were performed.  I reviewed comprehensive labs which are remarkable for white blood cell count of 7.4, hemoglobin 13.2, no acute electrolyte abnormality, anion gap elevated at 16, creatinine 0.79.  Folate 8.3, vitamin B12 elevated at 3114.  I discussed results with patient, at this point will recommend stop B12 supplementation.  Patient does have a follow-up with his primary care provider on Monday.  Recommend recheck of his level.  No other metabolic or electrolyte abnormality.  Patient is agreeable to discharge home with close outpatient follow-up and return precautions discussed.  Patient understands and agrees with the plan.      I have reviewed the nursing notes. I have reviewed the findings, diagnosis, plan and need for follow up with the patient.    New Prescriptions    No medications on file       Final diagnoses:   Paresthesias       --  Dolores Dominguez MD  [facility] EMERGENCY DEPARTMENT  12/23/2022     Dolores Dominguez MD  12/23/22 0715

## 2022-12-23 NOTE — ED TRIAGE NOTES
Pt has been on a ketamine saab for past few weeks, has been off for about a week. Presents with c/o lower extremity numbness.      Triage Assessment     Row Name 12/23/22 0329       Triage Assessment (Adult)    Airway WDL WDL       Respiratory WDL    Respiratory WDL WDL       Skin Circulation/Temperature WDL    Skin Circulation/Temperature WDL WDL       Cardiac WDL    Cardiac WDL WDL       Peripheral/Neurovascular WDL    Peripheral Neurovascular WDL WDL       Cognitive/Neuro/Behavioral WDL    Cognitive/Neuro/Behavioral WDL WDL

## 2022-12-23 NOTE — DISCHARGE INSTRUCTIONS
Thank you for your patience today.  Please follow-up with your regular doctor at your scheduled appointment on Monday. Please stop taking your B12 supplements as your levels today were high.  Please return to the ER if you develop any worsening of your current symptoms.  It was a pleasure taking care of you today.  We hope you feel better soon.

## 2023-01-26 ENCOUNTER — HOSPITAL ENCOUNTER (EMERGENCY)
Facility: CLINIC | Age: 38
Discharge: HOME | End: 2023-01-26
Attending: EMERGENCY MEDICINE | Admitting: EMERGENCY MEDICINE
Payer: COMMERCIAL

## 2023-01-26 ENCOUNTER — APPOINTMENT (OUTPATIENT)
Dept: CT IMAGING | Facility: CLINIC | Age: 38
End: 2023-01-26
Attending: EMERGENCY MEDICINE
Payer: COMMERCIAL

## 2023-01-26 VITALS
OXYGEN SATURATION: 97 % | RESPIRATION RATE: 16 BRPM | HEART RATE: 115 BPM | HEIGHT: 75 IN | DIASTOLIC BLOOD PRESSURE: 105 MMHG | SYSTOLIC BLOOD PRESSURE: 147 MMHG | BODY MASS INDEX: 21.65 KG/M2 | WEIGHT: 174.1 LBS | TEMPERATURE: 97.7 F

## 2023-01-26 DIAGNOSIS — H61.22 IMPACTED CERUMEN OF LEFT EAR: ICD-10-CM

## 2023-01-26 DIAGNOSIS — H74.8X2 HEMATOTYMPANUM OF LEFT EAR: ICD-10-CM

## 2023-01-26 PROCEDURE — 72125 CT NECK SPINE W/O DYE: CPT

## 2023-01-26 PROCEDURE — 99285 EMERGENCY DEPT VISIT HI MDM: CPT | Mod: 25

## 2023-01-26 PROCEDURE — 70480 CT ORBIT/EAR/FOSSA W/O DYE: CPT

## 2023-01-26 PROCEDURE — 70450 CT HEAD/BRAIN W/O DYE: CPT

## 2023-01-26 PROCEDURE — 99284 EMERGENCY DEPT VISIT MOD MDM: CPT | Performed by: EMERGENCY MEDICINE

## 2023-01-26 ASSESSMENT — ACTIVITIES OF DAILY LIVING (ADL): ADLS_ACUITY_SCORE: 35

## 2023-01-26 NOTE — ED TRIAGE NOTES
"Last Sat hit his L ear on the door frame, pain is not getting better, pt does not want to lose his hearing, \" I just want to make sure that its checked\" No drainage noted,  no balance issues     Triage Assessment     Row Name 01/26/23 0323       Triage Assessment (Adult)    Airway WDL WDL       Respiratory WDL    Respiratory WDL WDL       Skin Circulation/Temperature WDL    Skin Circulation/Temperature WDL WDL       Cardiac WDL    Cardiac WDL WDL       Peripheral/Neurovascular WDL    Peripheral Neurovascular WDL WDL       Cognitive/Neuro/Behavioral WDL    Cognitive/Neuro/Behavioral WDL WDL              "

## 2023-01-26 NOTE — ED PROVIDER NOTES
"ED Provider Note  [facility]      History     Chief Complaint   Patient presents with     Otalgia     Last Sat hit his L ear on the door frame, pain is not getting better, pt does not want to lose his hearing, \" I just want to make sure that its checked\" No drainage noted,  no balance issues     HPI  Tres Ospina is a 37 year old male who presents to the emergency department for evaluation of decreased hearing from his left ear.  Patient states that 5 days ago, he struck his left ear and side of the head on the frame of his friend's car when he was getting into it.  Patient states that he had been drinking alcohol at the time.  He denies any fall or loss of consciousness.  Since then, he has had discomfort on the left side of his head, left ear, and muffled hearing in his left ear.  He reports he has a history of ankylosing spondylitis and chronic neck pain.  He denies any weakness or numbness.  No chest pain or dyspnea.  No abdominal pain.  No nausea or vomiting.  He denies anticoagulant use.  He denies any use of Q-tips or placing anything in his ear before or after the aforementioned trauma.    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     History of COVID-19 6/11/2021     Psoriatic arthritis (H) 6/11/2021     Past Surgical History:   Procedure Laterality Date     KNEE SURGERY Right 04/22/2021    meniscus removal and repair     carbamide peroxide (DEBROX) 6.5 % otic solution  augmented betamethasone dipropionate (DIPROLENE-AF) 0.05 % external cream  carbidopa-levodopa (SINEMET CR)  MG CR tablet  carbidopa-levodopa (SINEMET)  MG tablet  clobetasol (TEMOVATE) 0.05 % external cream  etanercept (ENBREL SURECLICK) 50 MG/ML autoinjector  fish oil-omega-3 fatty acids 1000 MG capsule  gabapentin (NEURONTIN) 300 MG capsule  hydrocortisone 2.5 % ointment  hydrOXYzine (ATARAX) 25 MG " "tablet  levocetirizine (XYZAL) 5 MG tablet  magnesium 250 MG tablet  medical cannabis (Patient's own supply)  metroNIDAZOLE (METROCREAM) 0.75 % external cream  mometasone (ELOCON) 0.1 % external cream  multivitamin w/minerals (THERA-VIT-M) tablet  naproxen (NAPROSYN) 500 MG tablet      No Known Allergies  Family History  Family History   Problem Relation Age of Onset     Depression Mother      Hypertension Mother      Depression Father      Alcoholism Father      Depression Sister      Attention Deficit Disorder Sister         ADHD, Anxiety.     Other - See Comments Maternal Cousin      Social History   Social History     Tobacco Use     Smoking status: Some Days     Packs/day: 0.25     Types: Cigarettes     Smokeless tobacco: Never     Tobacco comments:     Quit in 2007. Smoked occasionally. Smoked for 6 months.   Substance Use Topics     Alcohol use: Yes     Comment: Drinks occaionally.      Drug use: Yes     Types: Marijuana     Comment: medical cannabis         A medically appropriate review of systems was performed with pertinent positives and negatives noted in the HPI, and all other systems negative.    Physical Exam   BP: (!) 147/105  Pulse: 115  Temp: 97.7  F (36.5  C)  Resp: 16  Height: 190.5 cm (6' 3\")  Weight: 79 kg (174 lb 1.6 oz)  SpO2: 97 %  Physical Exam  Vitals and nursing note reviewed.   Constitutional:       Appearance: Normal appearance.   HENT:      Head: Normocephalic.      Right Ear: Tympanic membrane and ear canal normal. No hemotympanum.      Left Ear: Ear canal normal. There is hemotympanum. Tympanic membrane is not perforated.      Ears:     Eyes:      Extraocular Movements: Extraocular movements intact.      Pupils: Pupils are equal, round, and reactive to light.   Cardiovascular:      Rate and Rhythm: Normal rate.   Musculoskeletal:      Cervical back: Tenderness present. Pain with movement and muscular tenderness present. No spinous process tenderness. Normal range of motion.   Skin:   "   General: Skin is warm and dry.   Neurological:      General: No focal deficit present.      Mental Status: He is alert.      Motor: No weakness.      Coordination: Coordination normal.           ED Course, Procedures, & Data      Procedures                      Results for orders placed or performed during the hospital encounter of 01/26/23   CT Temporal Bones wo Contrast     Status: None    Narrative    EXAM: CT TEMPORAL W/O CONTRAST  LOCATION: [facility]  DATE/TIME: 1/26/2023 4:24 AM    INDICATION: Trauma, left hemotympanums  COMPARISON: None.  TECHNIQUE:  Routine without contrast including dedicated thin section multiplanar reformats of each temporal bone. Dose reduction techniques were used.    FINDINGS:  Motion artifact limits aspects of exam.    RIGHT TEMPORAL BONE:   No acute bone fracture. External auditory canal within normal limits. Tympanic membrane within normal limits. Scutum intact. Tegmen intact.    Medial ear ossicles are intact. No middle ear ossicular erosions.    Middle ear cavity (epitympanum, mesotympanum, hypotympanum) clear. Round window niche, sinus tympani, facial recess, and oval window clear. Lateral epitympanic space clear.    Mastoid air cells are clear.    Cochlea, vestibule, semicircular canals, and vestibular aqueduct within normal limits.    Facial nerve canal is normal in course and caliber. No clear bony covering along the inferior aspect tympanic segment cranial nerve VII maybe imperceptible or dehiscent.    Jugular fossa within normal limits.    No evidence for otosclerosis.      LEFT TEMPORAL BONE:   No acute fracture.    Left external auditory canal oval-shaped opacity abutting the tympanic membrane likely large amount of cerumen impaction. This obscures the tympanic membrane.    Scutum intact. Tegmen intact.    Medial ear ossicles are intact. No middle ear ossicular erosions.    Middle ear cavity (epitympanum, mesotympanum,  hypotympanum) clear. Round window niche, sinus tympani, facial recess, and oval window clear. Lateral epitympanic space clear.    Mastoid air cells are clear.    Cochlea, vestibule, semicircular canals, and vestibular aqueduct within normal limits.    Facial nerve canal is normal in course and caliber. No clear bony covering along the inferior aspect tympanic segment cranial nerve VII maybe imperceptible or dehiscent.    Jugular fossa within normal limits.    No evidence for otosclerosis.      OTHER: No visualized paranasal sinus mucosal disease. The visualized intracranial compartment is unremarkable.        Impression    IMPRESSION:  Motion artifact limits aspects of exam.    RIGHT:  1.  No acute bone fracture.  2.  Tympanomastoid cavity clear.  3.  Middle ear ossicles intact with no dislocations.    LEFT:  1.  No acute fracture.  2.  Left external auditory canal oval-shaped opacity abutting the tympanic membrane likely large amount of cerumen impaction. This obscures the tympanic membrane.  3.  Tympanomastoid cavity clear.  4.  Middle ear ossicles intact with no dislocations.   Head CT w/o contrast     Status: None    Narrative    EXAM: CT HEAD W/O CONTRAST  LOCATION: [facility]  DATE/TIME: 1/26/2023 4:21 AM    INDICATION: Blunt head injury  COMPARISON: None.  TECHNIQUE: Routine CT Head without IV contrast. Multiplanar reformats. Dose reduction techniques were used.    FINDINGS:  Streak artifact limits aspects of exam.    INTRACRANIAL CONTENTS: No intracranial hemorrhage, extraaxial collection, or mass effect.  No CT evidence of acute infarct. Normal parenchymal attenuation. Normal ventricles and sulci.     VISUALIZED ORBITS/SINUSES/MASTOIDS:  Mild mucosal thickening scattered about the paranasal sinuses. No middle ear or mastoid effusion. Bilateral superior sclera demonstrate calcifications.    BONES/SOFT TISSUES: No acute abnormality.      Impression     IMPRESSION:  1.  No acute intracranial process.       CT Cervical Spine w/o Contrast     Status: None    Narrative    EXAM: CT CERVICAL SPINE W/O CONTRAST  LOCATION: [facility]  DATE/TIME: 1/26/2023 4:23 AM    INDICATION: pain, trauma  COMPARISON: None.  TECHNIQUE: Routine CT Cervical Spine without IV contrast. Multiplanar reformats. Dose reduction techniques were used.    FINDINGS:  VERTEBRA: No acute fracture.  No acute post traumatic subluxations.   Normal vertebral body heights.    CANAL/FORAMINA: Mild multilevel spondylosis result in various levels and degrees of central canal stenosis and neural foraminal stenosis. No critical spinal canal stenosis.   No significant subluxations.    PARASPINAL: Multiple small and prominent lymph nodes within the neck.        Impression     IMPRESSION:  1.  No acute fracture.   2.  Mild degenerative changes described above.       Medications - No data to display  Labs Ordered and Resulted from Time of ED Arrival to Time of ED Departure - No data to display  CT Temporal Bones wo Contrast   Final Result   IMPRESSION:   Motion artifact limits aspects of exam.      RIGHT:   1.  No acute bone fracture.   2.  Tympanomastoid cavity clear.   3.  Middle ear ossicles intact with no dislocations.      LEFT:   1.  No acute fracture.   2.  Left external auditory canal oval-shaped opacity abutting the tympanic membrane likely large amount of cerumen impaction. This obscures the tympanic membrane.   3.  Tympanomastoid cavity clear.   4.  Middle ear ossicles intact with no dislocations.      CT Cervical Spine w/o Contrast   Final Result    IMPRESSION:   1.  No acute fracture.    2.  Mild degenerative changes described above.         Head CT w/o contrast   Final Result   IMPRESSION:   1.  No acute intracranial process.                   Medical Decision Making  The patient's presentation is strongly suggestive of an acute complicated injury.    The  patient's evaluation involved:  ordering and/or review of 3+ test(s) in this encounter (see separate area of note for details)    The patient's management involved prescription drug management (including medications given in the ED).      Assessment & Plan    37 year old male to female department with decreased hearing out of left ear after blunt trauma 5 days ago.  He has a small hemotympanum on exam as well as some mild cerumen impaction that is adjacent to the TM.  CT imaging does not reveal any fracture.  Head CT and cervical spine CT is also negative.  We will discharged home with Debrox eardrops.  He has primary care follow-up in the next few days.  He plans to have his ear rechecked at that time.  I did recommend ENT follow-up if his symptoms do not resolve within the next week or so.    I have reviewed the nursing notes. I have reviewed the findings, diagnosis, plan and need for follow up with the patient.    New Prescriptions    CARBAMIDE PEROXIDE (DEBROX) 6.5 % OTIC SOLUTION    Place 5 drops Into the left ear 2 times daily       Final diagnoses:   Hematotympanum of left ear   Impacted cerumen of left ear       Long Heaton Md  [facility] EMERGENCY DEPARTMENT  1/26/2023     Long Heaton MD  01/26/23 0514

## 2023-03-28 ENCOUNTER — HOSPITAL ENCOUNTER (EMERGENCY)
Facility: CLINIC | Age: 38
Discharge: HOME | End: 2023-03-28
Attending: EMERGENCY MEDICINE | Admitting: EMERGENCY MEDICINE
Payer: COMMERCIAL

## 2023-03-28 ENCOUNTER — APPOINTMENT (OUTPATIENT)
Dept: GENERAL RADIOLOGY | Facility: CLINIC | Age: 38
End: 2023-03-28
Attending: EMERGENCY MEDICINE
Payer: COMMERCIAL

## 2023-03-28 VITALS
OXYGEN SATURATION: 99 % | SYSTOLIC BLOOD PRESSURE: 138 MMHG | RESPIRATION RATE: 17 BRPM | DIASTOLIC BLOOD PRESSURE: 74 MMHG | TEMPERATURE: 98 F | HEART RATE: 88 BPM

## 2023-03-28 DIAGNOSIS — S93.401A SPRAIN OF RIGHT ANKLE, UNSPECIFIED LIGAMENT, INITIAL ENCOUNTER: ICD-10-CM

## 2023-03-28 PROCEDURE — 99283 EMERGENCY DEPT VISIT LOW MDM: CPT | Performed by: EMERGENCY MEDICINE

## 2023-03-28 PROCEDURE — 73630 X-RAY EXAM OF FOOT: CPT | Mod: RT

## 2023-03-28 PROCEDURE — 73502 X-RAY EXAM HIP UNI 2-3 VIEWS: CPT

## 2023-03-28 PROCEDURE — 73502 X-RAY EXAM HIP UNI 2-3 VIEWS: CPT | Mod: 26 | Performed by: RADIOLOGY

## 2023-03-28 PROCEDURE — 73610 X-RAY EXAM OF ANKLE: CPT | Mod: 26 | Performed by: RADIOLOGY

## 2023-03-28 PROCEDURE — 73630 X-RAY EXAM OF FOOT: CPT | Mod: 26 | Performed by: RADIOLOGY

## 2023-03-28 PROCEDURE — 99285 EMERGENCY DEPT VISIT HI MDM: CPT | Performed by: EMERGENCY MEDICINE

## 2023-03-28 PROCEDURE — 73610 X-RAY EXAM OF ANKLE: CPT | Mod: RT

## 2023-03-28 ASSESSMENT — ACTIVITIES OF DAILY LIVING (ADL)
ADLS_ACUITY_SCORE: 33
ADLS_ACUITY_SCORE: 33

## 2023-03-28 NOTE — ED TRIAGE NOTES
From home for fall last night   Slipped on ice  Etoh on board  Having R ankle and L hip pain  Not on thinners  Has taken ibu w/o relief     Triage Assessment     Row Name 03/28/23 4803       Triage Assessment (Adult)    Airway WDL WDL       Respiratory WDL    Respiratory WDL WDL       Skin Circulation/Temperature WDL    Skin Circulation/Temperature WDL WDL       Cardiac WDL    Cardiac WDL WDL       Peripheral/Neurovascular WDL    Peripheral Neurovascular WDL WDL       Cognitive/Neuro/Behavioral WDL    Cognitive/Neuro/Behavioral WDL WDL

## 2023-03-28 NOTE — ED PROVIDER NOTES
ED Provider Note  [facility]      History     Chief Complaint   Patient presents with     Ankle Pain     Hip Pain     HPI  Tres Ospina is a 37 year old male who presents to the ED for evaluation of right ankle and left hip pain.  The pain started last night after a fall when he slipped on ice.  He admitted to drinking some alcohol at that time.  He denies head injury or loss of consciousness.  Pain is isolated to the right lateral ankle, right lateral midfoot, and left greater trochanter.  Denies any neck pain, back pain, or other injuries.  No numbness/tingling.  Is able to ambulate with antalgic gait.  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     History of COVID-19 6/11/2021     Psoriatic arthritis (H) 6/11/2021     Past Surgical History:   Procedure Laterality Date     KNEE SURGERY Right 04/22/2021    meniscus removal and repair     augmented betamethasone dipropionate (DIPROLENE-AF) 0.05 % external cream  carbamide peroxide (DEBROX) 6.5 % otic solution  carbidopa-levodopa (SINEMET CR)  MG CR tablet  carbidopa-levodopa (SINEMET)  MG tablet  clobetasol (TEMOVATE) 0.05 % external cream  etanercept (ENBREL SURECLICK) 50 MG/ML autoinjector  fish oil-omega-3 fatty acids 1000 MG capsule  gabapentin (NEURONTIN) 300 MG capsule  hydrocortisone 2.5 % ointment  hydrOXYzine (ATARAX) 25 MG tablet  levocetirizine (XYZAL) 5 MG tablet  magnesium 250 MG tablet  medical cannabis (Patient's own supply)  metroNIDAZOLE (METROCREAM) 0.75 % external cream  mometasone (ELOCON) 0.1 % external cream  multivitamin w/minerals (THERA-VIT-M) tablet  naproxen (NAPROSYN) 500 MG tablet      No Known Allergies  Family History  Family History   Problem Relation Age of Onset     Depression Mother      Hypertension Mother      Depression  Father      Alcoholism Father      Depression Sister      Attention Deficit Disorder Sister         ADHD, Anxiety.     Other - See Comments Maternal Cousin      Social History   Social History     Tobacco Use     Smoking status: Some Days     Packs/day: 0.25     Types: Cigarettes     Smokeless tobacco: Never     Tobacco comments:     Quit in 2007. Smoked occasionally. Smoked for 6 months.   Substance Use Topics     Alcohol use: Yes     Comment: Drinks occaionally.      Drug use: Yes     Types: Marijuana     Comment: medical cannabis         A medically appropriate review of systems was performed with pertinent positives and negatives noted in the HPI, and all other systems negative.    Physical Exam   BP: (!) 146/97  Pulse: 91  Temp: 98  F (36.7  C)  Resp: 17  SpO2: 97 %  Physical Exam  Vitals and nursing note reviewed.   Constitutional:       General: He is not in acute distress.     Appearance: Normal appearance.   HENT:      Head: Normocephalic.      Nose: Nose normal.   Eyes:      Pupils: Pupils are equal, round, and reactive to light.   Cardiovascular:      Rate and Rhythm: Normal rate and regular rhythm.   Pulmonary:      Effort: Pulmonary effort is normal.   Abdominal:      General: There is no distension.   Musculoskeletal:         General: No deformity. Normal range of motion.      Cervical back: Normal range of motion.      Comments: Pain/tenderness/swelling over the right lateral malleolus as well as the base of the fourth/fifth metatarsals.  Neurovascularly intact.  Negative drawer testing.   Skin:     General: Skin is warm.   Neurological:      Mental Status: He is alert and oriented to person, place, and time.   Psychiatric:         Mood and Affect: Mood normal.           ED Course, Procedures, & Data     ED Course as of 03/28/23 1744   Tue Mar 28, 2023   1654 XR Foot Right 3 Views        Results for orders placed or performed during the hospital encounter of 03/28/23   XR Pelvis and Hip Left 2 Views      Status: None    Narrative    EXAM: XR PELVIS AND HIP LEFT 2 VIEWS  LOCATION: [facility]  DATE/TIME: 3/28/2023 4:37 PM    INDICATION: Left hip pain after a fall.  COMPARISON: None.      Impression    IMPRESSION: Normal joint spaces and alignment. No fracture.   XR Ankle Right 3 Views     Status: None    Narrative    EXAM: XR ANKLE RIGHT G/E 3 VIEWS  LOCATION: [facility]  DATE/TIME: 3/28/2023 4:36 PM    INDICATION: R lateral ankle pain s p fall  COMPARISON: None.      Impression    IMPRESSION: Normal joint spaces and alignment. No fracture. The ankle mortise is intact.   XR Foot Right 3 Views     Status: None    Narrative    EXAM: XR FOOT RIGHT G/E 3 VIEWS  LOCATION: [facility]  DATE/TIME: 3/28/2023 4:37 PM    INDICATION: R lateral foot pain s p fall  COMPARISON: None.      Impression    IMPRESSION: Normal joint spaces and alignment. No fracture.     Medications - No data to display  Labs Ordered and Resulted from Time of ED Arrival to Time of ED Departure - No data to display  XR Pelvis and Hip Left 2 Views   Final Result   IMPRESSION: Normal joint spaces and alignment. No fracture.      XR Foot Right 3 Views   Final Result   IMPRESSION: Normal joint spaces and alignment. No fracture.      XR Ankle Right 3 Views   Final Result   IMPRESSION: Normal joint spaces and alignment. No fracture. The ankle mortise is intact.             Critical care was not performed.     Medical Decision Making  The patient's presentation was of moderate complexity (an acute complicated injury).    The patient's evaluation involved:  ordering and/or review of 3+ test(s) in this encounter (see separate area of note for details)    The patient's management necessitated only low risk treatment.      Assessment & Plan    On arrival, patient is normal vital signs.  He has pain over the lateral malleolus as  well as the dorsum of the foot at the base of the fourth and fifth metatarsals.  No significant ligament laxity noted.  Patient is ambulatory with antalgic gait.  Also has some tenderness over the left greater trochanter.    X-rays negative for acute fracture or dislocation.  No other acute abnormalities on imaging.  Symptoms consistent with likely grade 1 sprain of anterior talofibular ligament with associated contusion of the metatarsals.  Recommend RICE and PCP follow-up in 1 week.  Not requiring crutches.    I have reviewed the nursing notes. I have reviewed the findings, diagnosis, plan and need for follow up with the patient.    Discharge Medication List as of 3/28/2023  5:03 PM          Final diagnoses:   Sprain of right ankle, unspecified ligament, initial encounter       Julian Schumacher DO  [facility] EMERGENCY DEPARTMENT  3/28/2023     Julian Schumacher DO  03/28/23 1746

## 2023-06-20 DIAGNOSIS — L40.50 PSORIATIC ARTHRITIS (H): ICD-10-CM

## 2023-06-20 DIAGNOSIS — G24.1 DOPA RESPONSIVE DYSTONIA: ICD-10-CM

## 2023-06-20 RX ORDER — CARBIDOPA AND LEVODOPA 25; 100 MG/1; MG/1
TABLET ORAL
Qty: 60 TABLET | Refills: 0 | Status: SHIPPED | OUTPATIENT
Start: 2023-06-20 | End: 2023-10-06

## 2023-06-20 NOTE — TELEPHONE ENCOUNTER
Will send a 30 day supply to be signed with a note stating he needs an appointment for further refills.

## 2023-08-13 ENCOUNTER — HOSPITAL ENCOUNTER (EMERGENCY)
Facility: CLINIC | Age: 38
Discharge: HOME | End: 2023-08-13
Attending: EMERGENCY MEDICINE | Admitting: EMERGENCY MEDICINE
Payer: COMMERCIAL

## 2023-08-13 VITALS
RESPIRATION RATE: 11 BRPM | TEMPERATURE: 97.7 F | HEART RATE: 74 BPM | BODY MASS INDEX: 22.5 KG/M2 | DIASTOLIC BLOOD PRESSURE: 71 MMHG | OXYGEN SATURATION: 100 % | SYSTOLIC BLOOD PRESSURE: 106 MMHG | WEIGHT: 180 LBS

## 2023-08-13 DIAGNOSIS — R25.9 INVOLUNTARY MOVEMENTS: ICD-10-CM

## 2023-08-13 LAB
ALBUMIN SERPL BCG-MCNC: 4.1 G/DL (ref 3.5–5.2)
ALP SERPL-CCNC: 53 U/L (ref 40–129)
ALT SERPL W P-5'-P-CCNC: 24 U/L (ref 0–70)
ANION GAP SERPL CALCULATED.3IONS-SCNC: 9 MMOL/L (ref 7–15)
AST SERPL W P-5'-P-CCNC: 23 U/L (ref 0–45)
BASOPHILS # BLD AUTO: 0 10E3/UL (ref 0–0.2)
BASOPHILS NFR BLD AUTO: 1 %
BILIRUB SERPL-MCNC: 0.5 MG/DL
BUN SERPL-MCNC: 15.8 MG/DL (ref 6–20)
CALCIUM SERPL-MCNC: 9 MG/DL (ref 8.6–10)
CHLORIDE SERPL-SCNC: 103 MMOL/L (ref 98–107)
CREAT SERPL-MCNC: 0.9 MG/DL (ref 0.67–1.17)
DEPRECATED HCO3 PLAS-SCNC: 22 MMOL/L (ref 22–29)
EOSINOPHIL # BLD AUTO: 0.2 10E3/UL (ref 0–0.7)
EOSINOPHIL NFR BLD AUTO: 4 %
ERYTHROCYTE [DISTWIDTH] IN BLOOD BY AUTOMATED COUNT: 14.3 % (ref 10–15)
GFR SERPL CREATININE-BSD FRML MDRD: >90 ML/MIN/1.73M2
GLUCOSE SERPL-MCNC: 104 MG/DL (ref 70–99)
HCT VFR BLD AUTO: 34 % (ref 40–53)
HGB BLD-MCNC: 12.2 G/DL (ref 13.3–17.7)
IMM GRANULOCYTES # BLD: 0 10E3/UL
IMM GRANULOCYTES NFR BLD: 1 %
LYMPHOCYTES # BLD AUTO: 2.2 10E3/UL (ref 0.8–5.3)
LYMPHOCYTES NFR BLD AUTO: 34 %
MCH RBC QN AUTO: 35.6 PG (ref 26.5–33)
MCHC RBC AUTO-ENTMCNC: 35.9 G/DL (ref 31.5–36.5)
MCV RBC AUTO: 99 FL (ref 78–100)
MONOCYTES # BLD AUTO: 0.7 10E3/UL (ref 0–1.3)
MONOCYTES NFR BLD AUTO: 11 %
NEUTROPHILS # BLD AUTO: 3.2 10E3/UL (ref 1.6–8.3)
NEUTROPHILS NFR BLD AUTO: 49 %
NRBC # BLD AUTO: 0 10E3/UL
NRBC BLD AUTO-RTO: 0 /100
PLATELET # BLD AUTO: 251 10E3/UL (ref 150–450)
POTASSIUM SERPL-SCNC: 3.9 MMOL/L (ref 3.4–5.3)
PROT SERPL-MCNC: 6.7 G/DL (ref 6.4–8.3)
RBC # BLD AUTO: 3.43 10E6/UL (ref 4.4–5.9)
SODIUM SERPL-SCNC: 134 MMOL/L (ref 136–145)
WBC # BLD AUTO: 6.3 10E3/UL (ref 4–11)

## 2023-08-13 PROCEDURE — 93005 ELECTROCARDIOGRAM TRACING: CPT

## 2023-08-13 PROCEDURE — 99284 EMERGENCY DEPT VISIT MOD MDM: CPT | Mod: 25

## 2023-08-13 PROCEDURE — 80053 COMPREHEN METABOLIC PANEL: CPT | Performed by: EMERGENCY MEDICINE

## 2023-08-13 PROCEDURE — 36415 COLL VENOUS BLD VENIPUNCTURE: CPT | Performed by: EMERGENCY MEDICINE

## 2023-08-13 PROCEDURE — 96360 HYDRATION IV INFUSION INIT: CPT

## 2023-08-13 PROCEDURE — 258N000003 HC RX IP 258 OP 636: Performed by: EMERGENCY MEDICINE

## 2023-08-13 PROCEDURE — 85025 COMPLETE CBC W/AUTO DIFF WBC: CPT | Performed by: EMERGENCY MEDICINE

## 2023-08-13 RX ADMIN — SODIUM CHLORIDE 1000 ML: 9 INJECTION, SOLUTION INTRAVENOUS at 13:44

## 2023-08-13 NOTE — ED PROVIDER NOTES
History     Chief Complaint:  Seizures and Withdrawal       HPI     Tres Ospina is a 38 year old male presents with concerns of involuntary movements and potential for seizure.  Patient reports last week he believes he had a seizure.  He was visiting [place].  He was walking to [employer] and had a few involuntary movements of the upper extremities.  He then went to [facility] and had an episode of loss of consciousness.  He had urinary incontinence.  He believes he was unconscious for left few minutes.  Upon awakening there was no confusion.  He was seen at [facility] in [place].  He reports that blood work, EKG and MRI of the brain were undertaken and he was discharged home.  There was concern that he had an alcohol withdrawal related seizure.  Patient reports that he has had minimal to no alcohol use since then.  The night prior to the seizure he also used cocaine and ecstasy.    Today he again had intermittent episodes of involuntary movements of the upper extremity only lasting 1-2 seconds.  He otherwise feels well but is concerned this may result in an impending seizure.  Although he has been largely abstinent from alcohol, cocaine and ecstasy since last week, he has been utilizing ketamine regularly with last dose 2 days ago.  He denies chest pain, shortness of breath or fever.  No history of seizure outside of this potential episode last week.      Independent Historian:    None    Review of External Notes:  None    Medications:    augmented betamethasone dipropionate (DIPROLENE-AF) 0.05 % external cream  carbamide peroxide (DEBROX) 6.5 % otic solution  carbidopa-levodopa (SINEMET CR)  MG CR tablet  carbidopa-levodopa (SINEMET)  MG tablet  clobetasol (TEMOVATE) 0.05 % external cream  etanercept (ENBREL SURECLICK) 50 MG/ML autoinjector  fish oil-omega-3 fatty acids 1000 MG capsule  gabapentin (NEURONTIN) 300 MG capsule  hydrocortisone 2.5 % ointment  hydrOXYzine (ATARAX) 25 MG  tablet  levocetirizine (XYZAL) 5 MG tablet  magnesium 250 MG tablet  medical cannabis (Patient's own supply)  metroNIDAZOLE (METROCREAM) 0.75 % external cream  mometasone (ELOCON) 0.1 % external cream  multivitamin w/minerals (THERA-VIT-M) tablet  naproxen (NAPROSYN) 500 MG tablet        Past Medical History:    Past Medical History:   Diagnosis Date    Depression     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    No tongue abrasion/laceration  Eyes:   PERRL.  EOMs intact.      No corneal clouding.   NECK:   Supple, no meningismus.       Negative Brudzinski's sign.  CV:    Regular rate and rhythm.    No murmurs, rubs or gallops.  PULM:   Clear to auscultation bilateral.      No respiratory distress.      No stridor or wheezing.  ABD:  Soft, non-tender, non-distended.      No rebound or guarding.  MSK:    No gross deformity to all four extremities.      No significant joint effusions.  LYMPH:  No cervical lymphadenopathy.  NEURO:  A & O x 3    CN II-XII intact, speech is clear with no aphasia.      Strength is 5/5 in all 4 extremities.  Sensation is intact.      Normal muscular tone, no tremor.    No involuntary movements noted.  SKIN:   Warm, dry   PSYCH:   Anxious      Emergency Department Course   ECG  ECG results from 08/13/23   EKG 12-lead, tracing only     Value    Systolic Blood Pressure     Diastolic Blood Pressure     Ventricular Rate 64    Atrial Rate 64    OH  Interval 152    QRS Duration 86        QTc 396    P Axis 67    R AXIS 38    T Axis 53    Interpretation ECG      Sinus rhythm  Normal ECG  No previous ECGs available           Laboratory:  Labs Ordered and Resulted from Time of ED Arrival to Time of ED Departure   COMPREHENSIVE METABOLIC PANEL - Abnormal       Result Value    Sodium 134 (*)     Potassium 3.9      Chloride 103      Carbon Dioxide (CO2) 22      Anion Gap 9      Urea Nitrogen 15.8      Creatinine 0.90      Calcium 9.0      Glucose 104 (*)     Alkaline Phosphatase 53      AST 23      ALT 24      Protein Total 6.7      Albumin 4.1      Bilirubin Total 0.5      GFR Estimate >90     CBC WITH PLATELETS AND DIFFERENTIAL - Abnormal    WBC Count 6.3      RBC Count 3.43 (*)     Hemoglobin 12.2 (*)     Hematocrit 34.0 (*)     MCV 99      MCH 35.6 (*)     MCHC 35.9      RDW 14.3      Platelet Count 251      % Neutrophils 49      % Lymphocytes 34      % Monocytes 11      % Eosinophils 4      % Basophils 1      % Immature Granulocytes 1      NRBCs per 100 WBC 0      Absolute Neutrophils 3.2      Absolute Lymphocytes 2.2      Absolute Monocytes 0.7      Absolute Eosinophils 0.2      Absolute Basophils 0.0      Absolute Immature Granulocytes 0.0      Absolute NRBCs 0.0        Emergency Department Course & Assessments:      Interventions:  Medications   0.9% sodium chloride BOLUS (1,000 mLs Intravenous $New Bag 23 2815)          Independent Interpretation (X-rays, CTs, rhythm strip):  None    Consultations/Discussion of Management or Tests:  None       Social Determinants of Health affecting care:  None     Disposition:  The patient was discharged to home.     Impression & Plan      Medical Decision Makin-year-old male presents with concerns of involuntary movements of the upper extremities which have not been present during ED course.  Patient was primarily concerned about ketamine withdrawal or return of seizure.  Seizure last week likely related  to cocaine abuse or alcohol withdrawal.  He has avoided alcohol and cocaine largely the last week thus unlikely contributing to his symptoms.      He was also concerned that movements may be related to ketamine withdrawal.  Certainly symptoms could be related to ketamine withdrawal or his underlying dystonia.  EKG without dysrhythmia.  He has no anemia, hypoglycemia or electrolyte disturbance to otherwise account for symptoms.  No indication for an advanced imaging the brain.  Patient reported MRI of the brain last week in [place].  Unfortunately we are unable to visualize those reports/images as they are not part of care everywhere and medical records not open at their facility.  Patient safe for discharge home and instructed on drug cessation.      Diagnosis:    ICD-10-CM    1. Involuntary movements  R25.9            Discharge Medications:  New Prescriptions    No medications on file          8/13/2023   Wing Agustin MD Matthews, Jeremiah R, MD  08/13/23 2576

## 2023-08-13 NOTE — ED NOTES
Placed Pt. on monitor (5-lead continous ECG, pulse ox, BP cuff) EKG complete. Call light in place

## 2023-08-13 NOTE — ED TRIAGE NOTES
Pt presents to ED with concern for seizure. Pt reports he had a seizure last week. Reports he's concerned he's going to have another seizure due to BL arms and leg jerking that started last night. Patient believes this is from ketamine withdrawal. Denies N/V/D. Last used ketamine 2 days ago. A&OX4.      Triage Assessment       Row Name 08/13/23 1311       Triage Assessment (Adult)    Airway WDL WDL       Respiratory WDL    Respiratory WDL WDL       Skin Circulation/Temperature WDL    Skin Circulation/Temperature WDL WDL       Cardiac WDL    Cardiac WDL WDL       Peripheral/Neurovascular WDL    Peripheral Neurovascular WDL WDL       Cognitive/Neuro/Behavioral WDL    Cognitive/Neuro/Behavioral WDL WDL

## 2023-08-14 LAB
ATRIAL RATE - MUSE: 64 BPM
DIASTOLIC BLOOD PRESSURE - MUSE: NORMAL MMHG
INTERPRETATION ECG - MUSE: NORMAL
P AXIS - MUSE: 67 DEGREES
PR INTERVAL - MUSE: 152 MS
QRS DURATION - MUSE: 86 MS
QT - MUSE: 384 MS
QTC - MUSE: 396 MS
R AXIS - MUSE: 38 DEGREES
SYSTOLIC BLOOD PRESSURE - MUSE: NORMAL MMHG
T AXIS - MUSE: 53 DEGREES
VENTRICULAR RATE- MUSE: 64 BPM

## 2023-08-19 ENCOUNTER — HOSPITAL ENCOUNTER (EMERGENCY)
Facility: CLINIC | Age: 38
Discharge: HOME | End: 2023-08-19
Attending: EMERGENCY MEDICINE | Admitting: EMERGENCY MEDICINE
Payer: COMMERCIAL

## 2023-08-19 ENCOUNTER — APPOINTMENT (OUTPATIENT)
Dept: GENERAL RADIOLOGY | Facility: CLINIC | Age: 38
End: 2023-08-19
Attending: EMERGENCY MEDICINE
Payer: COMMERCIAL

## 2023-08-19 VITALS
OXYGEN SATURATION: 97 % | BODY MASS INDEX: 23.1 KG/M2 | WEIGHT: 180 LBS | TEMPERATURE: 98.4 F | RESPIRATION RATE: 18 BRPM | DIASTOLIC BLOOD PRESSURE: 81 MMHG | HEIGHT: 74 IN | SYSTOLIC BLOOD PRESSURE: 135 MMHG | HEART RATE: 78 BPM

## 2023-08-19 DIAGNOSIS — S22.42XA CLOSED FRACTURE OF MULTIPLE RIBS OF LEFT SIDE, INITIAL ENCOUNTER: ICD-10-CM

## 2023-08-19 LAB
ALBUMIN SERPL BCG-MCNC: 5.1 G/DL (ref 3.5–5.2)
ALP SERPL-CCNC: 65 U/L (ref 40–129)
ALT SERPL W P-5'-P-CCNC: 34 U/L (ref 0–70)
ANION GAP SERPL CALCULATED.3IONS-SCNC: 14 MMOL/L (ref 7–15)
AST SERPL W P-5'-P-CCNC: 27 U/L (ref 0–45)
BASOPHILS # BLD AUTO: 0 10E3/UL (ref 0–0.2)
BASOPHILS NFR BLD AUTO: 0 %
BILIRUB SERPL-MCNC: 0.6 MG/DL
BUN SERPL-MCNC: 16 MG/DL (ref 6–20)
CALCIUM SERPL-MCNC: 9.7 MG/DL (ref 8.6–10)
CHLORIDE SERPL-SCNC: 102 MMOL/L (ref 98–107)
CK SERPL-CCNC: 250 U/L (ref 39–308)
CREAT SERPL-MCNC: 0.94 MG/DL (ref 0.67–1.17)
DEPRECATED HCO3 PLAS-SCNC: 22 MMOL/L (ref 22–29)
EOSINOPHIL # BLD AUTO: 0.2 10E3/UL (ref 0–0.7)
EOSINOPHIL NFR BLD AUTO: 1 %
ERYTHROCYTE [DISTWIDTH] IN BLOOD BY AUTOMATED COUNT: 13.9 % (ref 10–15)
FOLATE SERPL-MCNC: 12.5 NG/ML (ref 4.6–34.8)
GFR SERPL CREATININE-BSD FRML MDRD: >90 ML/MIN/1.73M2
GLUCOSE SERPL-MCNC: 103 MG/DL (ref 70–99)
HCT VFR BLD AUTO: 39.8 % (ref 40–53)
HGB BLD-MCNC: 13.7 G/DL (ref 13.3–17.7)
HOLD SPECIMEN: NORMAL
HOLD SPECIMEN: NORMAL
IMM GRANULOCYTES # BLD: 0.1 10E3/UL
IMM GRANULOCYTES NFR BLD: 1 %
LYMPHOCYTES # BLD AUTO: 2.2 10E3/UL (ref 0.8–5.3)
LYMPHOCYTES NFR BLD AUTO: 20 %
MAGNESIUM SERPL-MCNC: 2.1 MG/DL (ref 1.7–2.3)
MCH RBC QN AUTO: 34.7 PG (ref 26.5–33)
MCHC RBC AUTO-ENTMCNC: 34.4 G/DL (ref 31.5–36.5)
MCV RBC AUTO: 101 FL (ref 78–100)
MONOCYTES # BLD AUTO: 0.9 10E3/UL (ref 0–1.3)
MONOCYTES NFR BLD AUTO: 8 %
NEUTROPHILS # BLD AUTO: 7.6 10E3/UL (ref 1.6–8.3)
NEUTROPHILS NFR BLD AUTO: 70 %
NRBC # BLD AUTO: 0 10E3/UL
NRBC BLD AUTO-RTO: 0 /100
PHOSPHATE SERPL-MCNC: 3.6 MG/DL (ref 2.5–4.5)
PLATELET # BLD AUTO: 330 10E3/UL (ref 150–450)
POTASSIUM SERPL-SCNC: 4.4 MMOL/L (ref 3.4–5.3)
PROT SERPL-MCNC: 8 G/DL (ref 6.4–8.3)
RBC # BLD AUTO: 3.95 10E6/UL (ref 4.4–5.9)
SODIUM SERPL-SCNC: 138 MMOL/L (ref 136–145)
VIT B12 SERPL-MCNC: 199 PG/ML (ref 232–1245)
WBC # BLD AUTO: 10.9 10E3/UL (ref 4–11)

## 2023-08-19 PROCEDURE — 71101 X-RAY EXAM UNILAT RIBS/CHEST: CPT | Mod: 26 | Performed by: RADIOLOGY

## 2023-08-19 PROCEDURE — 82550 ASSAY OF CK (CPK): CPT | Performed by: EMERGENCY MEDICINE

## 2023-08-19 PROCEDURE — 82607 VITAMIN B-12: CPT | Performed by: EMERGENCY MEDICINE

## 2023-08-19 PROCEDURE — 99284 EMERGENCY DEPT VISIT MOD MDM: CPT | Performed by: EMERGENCY MEDICINE

## 2023-08-19 PROCEDURE — 80053 COMPREHEN METABOLIC PANEL: CPT | Performed by: EMERGENCY MEDICINE

## 2023-08-19 PROCEDURE — 71101 X-RAY EXAM UNILAT RIBS/CHEST: CPT | Mod: LT

## 2023-08-19 PROCEDURE — 85025 COMPLETE CBC W/AUTO DIFF WBC: CPT | Performed by: EMERGENCY MEDICINE

## 2023-08-19 PROCEDURE — 36415 COLL VENOUS BLD VENIPUNCTURE: CPT | Performed by: EMERGENCY MEDICINE

## 2023-08-19 PROCEDURE — 83735 ASSAY OF MAGNESIUM: CPT | Performed by: EMERGENCY MEDICINE

## 2023-08-19 PROCEDURE — 82947 ASSAY GLUCOSE BLOOD QUANT: CPT | Performed by: EMERGENCY MEDICINE

## 2023-08-19 PROCEDURE — 82746 ASSAY OF FOLIC ACID SERUM: CPT | Performed by: EMERGENCY MEDICINE

## 2023-08-19 PROCEDURE — 84100 ASSAY OF PHOSPHORUS: CPT | Performed by: EMERGENCY MEDICINE

## 2023-08-19 RX ORDER — HYDROCODONE BITARTRATE AND ACETAMINOPHEN 5; 325 MG/1; MG/1
1 TABLET ORAL EVERY 6 HOURS PRN
Qty: 10 TABLET | Refills: 0 | Status: SHIPPED | OUTPATIENT
Start: 2023-08-19 | End: 2023-08-22

## 2023-08-19 ASSESSMENT — ACTIVITIES OF DAILY LIVING (ADL)
ADLS_ACUITY_SCORE: 35
ADLS_ACUITY_SCORE: 33

## 2023-08-19 NOTE — ED TRIAGE NOTES
"38 yr old male hx ketamine abuse BIBA from home presenting with numbness, tingling, pain \"all over.\" VSS en route. Blood sugar 114. Last used ketamine earlier. Reports drinking tequila earlier to help calm down. Pt reports mechanical fall fall today and fell onto left side - now experiencing left rib pain.       "

## 2023-08-20 NOTE — ED NOTES
Patient is sweating and beckoned the nurse to take his temperature, the temp was 98.1. The nurse asked when his last drink was, which he said was this afternoon, he states he does not drink that much.

## 2023-08-20 NOTE — DISCHARGE INSTRUCTIONS
Take 1000 to 2000 mcg of vitamin B12 per day.    Return to the emergency department if symptoms continue, get worse, there are any new symptoms or any cause for concern.

## 2023-08-20 NOTE — ED PROVIDER NOTES
ED Provider Note  [facility]      History     Chief Complaint   Patient presents with    Numbness     HPI  Tres Ospina is a 38 year old male who presents with full body paresthesias and weakness.  Patient states he believes this is from drug use.  He states he uses ketamine and Ytracis oxide primarily.  Also some alcohol and occasional cocaine use.  Patient states he has been using ketamine and nitrous oxide frequently and that he feels like he has generalized weakness as well as tingling in his extremities.  He also had a fall earlier today and struck the left side of his ribs.  No other symptoms noted.    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    History of COVID-19 6/11/2021    Psoriatic arthritis (H) 6/11/2021     Past Surgical History:   Procedure Laterality Date    KNEE SURGERY Right 04/22/2021    meniscus removal and repair     augmented betamethasone dipropionate (DIPROLENE-AF) 0.05 % external cream  carbamide peroxide (DEBROX) 6.5 % otic solution  carbidopa-levodopa (SINEMET CR)  MG CR tablet  carbidopa-levodopa (SINEMET)  MG tablet  clobetasol (TEMOVATE) 0.05 % external cream  etanercept (ENBREL SURECLICK) 50 MG/ML autoinjector  fish oil-omega-3 fatty acids 1000 MG capsule  gabapentin (NEURONTIN) 300 MG capsule  hydrocortisone 2.5 % ointment  hydrOXYzine (ATARAX) 25 MG tablet  levocetirizine (XYZAL) 5 MG tablet  magnesium 250 MG tablet  medical cannabis (Patient's own supply)  metroNIDAZOLE (METROCREAM) 0.75 % external cream  mometasone (ELOCON) 0.1 % external cream  multivitamin w/minerals (THERA-VIT-M) tablet  naproxen (NAPROSYN) 500 MG tablet      No Known Allergies  Family History  Family History   Problem Relation Age of Onset    Depression Mother     Hypertension Mother     Depression Father     Alcoholism Father     Depression Sister     Attention Deficit Disorder Sister       "   ADHD, Anxiety.    Other - See Comments Maternal Cousin      Social History   Social History     Tobacco Use    Smoking status: Some Days     Packs/day: 0.25     Types: Cigarettes    Smokeless tobacco: Never    Tobacco comments:     Quit in 2007. Smoked occasionally. Smoked for 6 months.   Substance Use Topics    Alcohol use: Yes     Comment: Drinks occaionally.     Drug use: Yes     Types: Marijuana     Comment: medical cannabis      Past medical history, past surgical history, medications, allergies, family history, and social history were reviewed with the patient. No additional pertinent items.      A medically appropriate review of systems was performed with pertinent positives and negatives noted in the HPI, and all other systems negative.    Physical Exam   BP: (!) 154/76  Pulse: 100  Temp: 99.6  F (37.6  C)  Resp: 20  Height: 188 cm (6' 2\")  Weight: 81.6 kg (180 lb)  SpO2: 97 %  Physical Exam  Vitals and nursing note reviewed.   Constitutional:       General: He is not in acute distress.     Appearance: He is well-developed. He is not diaphoretic.   HENT:      Head: Normocephalic and atraumatic.      Mouth/Throat:      Pharynx: No oropharyngeal exudate.   Eyes:      General: No scleral icterus.        Right eye: No discharge.         Left eye: No discharge.      Pupils: Pupils are equal, round, and reactive to light.   Cardiovascular:      Rate and Rhythm: Normal rate and regular rhythm.      Heart sounds: Normal heart sounds. No murmur heard.     No friction rub. No gallop.   Pulmonary:      Effort: Pulmonary effort is normal. No respiratory distress.      Breath sounds: Normal breath sounds. No wheezing.   Chest:      Chest wall: No tenderness.   Abdominal:      General: Bowel sounds are normal. There is no distension.      Palpations: Abdomen is soft.      Tenderness: There is no abdominal tenderness.   Musculoskeletal:         General: No tenderness or deformity. Normal range of motion.      Cervical " back: Normal range of motion and neck supple.   Skin:     General: Skin is warm and dry.      Coloration: Skin is not pale.      Findings: No erythema or rash.   Neurological:      Mental Status: He is alert and oriented to person, place, and time.      Cranial Nerves: No cranial nerve deficit.           ED Course, Procedures, & Data      Procedures                      Results for orders placed or performed during the hospital encounter of 08/19/23   Extra Tube ([place] Draw)     Status: None (In process)    Narrative    The following orders were created for panel order Extra Tube ([place] Draw).  Procedure                               Abnormality         Status                     ---------                               -----------         ------                     Extra Blue Top Tube[[id number]]                              In process                 Extra Green Top (Lithium...[[id number]]                      In process                 Extra Purple Top Tube[[id number]]                                                         Please view results for these tests on the individual orders.   Comprehensive metabolic panel     Status: Abnormal   Result Value Ref Range    Sodium 138 136 - 145 mmol/L    Potassium 4.4 3.4 - 5.3 mmol/L    Chloride 102 98 - 107 mmol/L    Carbon Dioxide (CO2) 22 22 - 29 mmol/L    Anion Gap 14 7 - 15 mmol/L    Urea Nitrogen 16.0 6.0 - 20.0 mg/dL    Creatinine 0.94 0.67 - 1.17 mg/dL    Calcium 9.7 8.6 - 10.0 mg/dL    Glucose 103 (H) 70 - 99 mg/dL    Alkaline Phosphatase 65 40 - 129 U/L    AST 27 0 - 45 U/L    ALT 34 0 - 70 U/L    Protein Total 8.0 6.4 - 8.3 g/dL    Albumin 5.1 3.5 - 5.2 g/dL    Bilirubin Total 0.6 <=1.2 mg/dL    GFR Estimate >90 >60 mL/min/1.73m2   Magnesium     Status: Normal   Result Value Ref Range    Magnesium 2.1 1.7 - 2.3 mg/dL   Phosphorus     Status: Normal   Result Value Ref Range    Phosphorus 3.6 2.5 - 4.5 mg/dL   CBC with platelets and differential     Status:  Abnormal   Result Value Ref Range    WBC Count 10.9 4.0 - 11.0 10e3/uL    RBC Count 3.95 (L) 4.40 - 5.90 10e6/uL    Hemoglobin 13.7 13.3 - 17.7 g/dL    Hematocrit 39.8 (L) 40.0 - 53.0 %     (H) 78 - 100 fL    MCH 34.7 (H) 26.5 - 33.0 pg    MCHC 34.4 31.5 - 36.5 g/dL    RDW 13.9 10.0 - 15.0 %    Platelet Count 330 150 - 450 10e3/uL    % Neutrophils 70 %    % Lymphocytes 20 %    % Monocytes 8 %    % Eosinophils 1 %    % Basophils 0 %    % Immature Granulocytes 1 %    NRBCs per 100 WBC 0 <1 /100    Absolute Neutrophils 7.6 1.6 - 8.3 10e3/uL    Absolute Lymphocytes 2.2 0.8 - 5.3 10e3/uL    Absolute Monocytes 0.9 0.0 - 1.3 10e3/uL    Absolute Eosinophils 0.2 0.0 - 0.7 10e3/uL    Absolute Basophils 0.0 0.0 - 0.2 10e3/uL    Absolute Immature Granulocytes 0.1 <=0.4 10e3/uL    Absolute NRBCs 0.0 10e3/uL   CBC with platelets differential     Status: Abnormal    Narrative    The following orders were created for panel order CBC with platelets differential.  Procedure                               Abnormality         Status                     ---------                               -----------         ------                     CBC with platelets and d...[[id number]]  Abnormal            Final result                 Please view results for these tests on the individual orders.     Medications - No data to display  Labs Ordered and Resulted from Time of ED Arrival to Time of ED Departure   COMPREHENSIVE METABOLIC PANEL - Abnormal       Result Value    Sodium 138      Potassium 4.4      Chloride 102      Carbon Dioxide (CO2) 22      Anion Gap 14      Urea Nitrogen 16.0      Creatinine 0.94      Calcium 9.7      Glucose 103 (*)     Alkaline Phosphatase 65      AST 27      ALT 34      Protein Total 8.0      Albumin 5.1      Bilirubin Total 0.6      GFR Estimate >90     CBC WITH PLATELETS AND DIFFERENTIAL - Abnormal    WBC Count 10.9      RBC Count 3.95 (*)     Hemoglobin 13.7      Hematocrit 39.8 (*)      (*)     MCH  34.7 (*)     MCHC 34.4      RDW 13.9      Platelet Count 330      % Neutrophils 70      % Lymphocytes 20      % Monocytes 8      % Eosinophils 1      % Basophils 0      % Immature Granulocytes 1      NRBCs per 100 WBC 0      Absolute Neutrophils 7.6      Absolute Lymphocytes 2.2      Absolute Monocytes 0.9      Absolute Eosinophils 0.2      Absolute Basophils 0.0      Absolute Immature Granulocytes 0.1      Absolute NRBCs 0.0     MAGNESIUM - Normal    Magnesium 2.1     PHOSPHORUS - Normal    Phosphorus 3.6     VITAMIN B12   FOLATE   CK TOTAL     Ribs XR, unilat 3 views + PA chest,  left    (Results Pending)              Assessment & Plan    This is a 38-year-old male who presents with numbness/tingling in his extremities as well as generalized weakness.  He is attributes this to frequent ketamine and nitrous oxide use.  He has been using this heavily recently.  Patient also drinks alcohol and occasionally uses cocaine.  He had a fall earlier today with left rib pain.  Exam demonstrates no acute abnormalities.  Lab work shows shows a B12 level of 199.  Chest x-ray and rib films show possible nondisplaced fractures of eighth and ninth ribs.  I discussed B12 supplementation with patient as well as care of rib fractures.  We will discharge with pain medication and incentive spirometer.  We will discharge with return precautions.    I have reviewed the nursing notes. I have reviewed the findings, diagnosis, plan and need for follow up with the patient.    New Prescriptions    No medications on file       Final diagnoses:   None       Luis Alberto Moyer DO  [facility] EMERGENCY DEPARTMENT  8/19/2023     Luis Alberto Moyer,   08/20/23 0333

## 2023-09-17 ENCOUNTER — HEALTH MAINTENANCE LETTER (OUTPATIENT)
Age: 38
End: 2023-09-17

## 2023-10-04 ENCOUNTER — MYC REFILL (OUTPATIENT)
Dept: NEUROLOGY | Facility: CLINIC | Age: 38
End: 2023-10-04
Payer: COMMERCIAL

## 2023-10-04 DIAGNOSIS — L40.50 PSORIATIC ARTHRITIS (H): ICD-10-CM

## 2023-10-04 DIAGNOSIS — G24.1 DOPA RESPONSIVE DYSTONIA: ICD-10-CM

## 2023-10-04 RX ORDER — CARBIDOPA AND LEVODOPA 25; 100 MG/1; MG/1
1 TABLET ORAL 2 TIMES DAILY
Qty: 60 TABLET | Refills: 0 | OUTPATIENT
Start: 2023-10-04

## 2023-10-04 NOTE — TELEPHONE ENCOUNTER
Rx Authorization:  Requested Medication/ Dose carbidopa-levodopa (SINEMET)  MG tablet   Date last refill ordered:   Quantity ordered:   # refills:   Date of last clinic visit with ordering provider:   Date of next clinic visit with ordering provider:   All pertinent protocol data (lab date/result):   Include pertinent information from patients message:

## 2023-10-04 NOTE — CONFIDENTIAL NOTE
Medication and strength: carbidopa/levodopa     Is this a [place] patient? Yes    Last appointment: 4/29/2022 with Dr. Henry    Next appointment: None scheduled    Last re-ordered: 6/20/23 for a 30 day supply with 0 refills    Action taken: Justin needs to make an appointment in order to receive further refills. Please call and let I'm know to schedule a follow up with Dr. Henry

## 2023-10-06 RX ORDER — CARBIDOPA AND LEVODOPA 25; 100 MG/1; MG/1
1 TABLET ORAL 2 TIMES DAILY
Qty: 60 TABLET | Refills: 0 | Status: SHIPPED | OUTPATIENT
Start: 2023-10-06 | End: 2023-10-27

## 2023-10-06 RX ORDER — CARBIDOPA AND LEVODOPA 25; 100 MG/1; MG/1
TABLET, EXTENDED RELEASE ORAL
Qty: 30 TABLET | Refills: 0 | Status: SHIPPED | OUTPATIENT
Start: 2023-10-06 | End: 2023-10-27

## 2023-10-10 PROBLEM — L40.9 PSORIASIS: Status: ACTIVE | Noted: 2020-09-09

## 2023-10-10 RX ORDER — CALCIPOTRIENE 50 UG/G
OINTMENT TOPICAL
COMMUNITY
Start: 2021-11-29

## 2023-10-10 RX ORDER — METHOCARBAMOL 750 MG/1
750 TABLET, FILM COATED ORAL EVERY 6 HOURS PRN
COMMUNITY
Start: 2023-02-08

## 2023-10-10 RX ORDER — LEVETIRACETAM 750 MG/1
750 TABLET ORAL 2 TIMES DAILY
COMMUNITY
Start: 2023-08-17 | End: 2023-10-27

## 2023-10-10 RX ORDER — FOLIC ACID 1 MG/1
TABLET ORAL
COMMUNITY
Start: 2023-08-17 | End: 2023-10-27

## 2023-10-10 RX ORDER — AMITRIPTYLINE HYDROCHLORIDE 50 MG/1
50 TABLET ORAL
COMMUNITY
Start: 2023-02-01 | End: 2023-10-27

## 2023-10-10 RX ORDER — NYSTATIN 100000/ML
500000 SUSPENSION, ORAL (FINAL DOSE FORM) ORAL 4 TIMES DAILY
COMMUNITY
Start: 2022-07-29 | End: 2023-10-27

## 2023-10-10 RX ORDER — LANOLIN ALCOHOL/MO/W.PET/CERES
1000 CREAM (GRAM) TOPICAL DAILY
COMMUNITY
Start: 2023-08-17

## 2023-10-10 RX ORDER — ALPRAZOLAM 0.5 MG
TABLET ORAL
COMMUNITY
Start: 2022-06-23 | End: 2023-10-27

## 2023-10-10 NOTE — PROGRESS NOTES
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  (primary encounter diagnosis)     Carbidopa/levodopa Sinemet 25/100 @ 10am and 10pm  Has  Involuntary movements after some of the evening doses  Tried CR and then went back on short acting 25/100 sinemet        Brain MRI 2022  Cervical MRI 2022     Scans were okay         Review of diagnosis    DRD     Avoidance of dopamine blockers         Motor complication review         Review of Impulse control disorders         Review of surgical or medication options         Gait/Balance/Falls         Exercise/Therapy performed/offered   Doing some exercise; walks dog        Cognitive/Driving   No cognitive problems  Driving bit     Mood   Panic attack while flight from [place] was messed up  He had some involuntary movements in feet and shoulders  A \"trigger\" in his body     Mood is \"pretty good\"  Unemployed presently  Living with rooming     Hallucinations/delusions   Sensation of water dripping     Sleep   jerkiness     Bladder/Renal/Prostate/Gyn/Other  No change     GI/Constipation/GERD   Elevated ALT (borderline)     ENDO/Lipid/DM/Bone density/Thyroid  No clear problems     Cardio/heart/Hyper or Hypotensive   Had elevated blood pressure reading in chart     Vision/Dry Eyes/Cataracts/Glaucoma/Macular   Wears glasses  Uveitis     Heme/Anticoagulation/Antiplatelet/Anemia/Other  Not anemic  No elevated white blood cell count     ENT/Resp     Skin/Cancer/Seborrhea/other  Rosacea     Musculoskeletal/Pain/Headache  Cervical Spine MRI 2020  1.  Multilevel cervical spondylosis.   2.  No high-grade spinal canal stenosis. No abnormal cord signal.   3.  Moderate neural foraminal " stenosis on the left at C3-C4 and on the right at C5-C6.     Psoriatic arthritis - last feb 2020  Started with pain in his upper spine   Given prednisone and then developed psoriasis  Prior right knee injury 2015 volleyball  Developed right knee swelling after biking  surgery of right knee 4/22/2021     Rheumatologist  Dr. 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        Other:  Latent TB and completed treatment on rifampin for 4 months from   Beginning of November till march 2021   He had a TB test prior to getting enbrel and so had to get tb treatment prior to enbrel     COVID19 and covid19 related rash  May 6th 2021 diagnosed    Has some spinal midline symptoms that are jolt like during the night - last neck  Discussed that it is like a myoclonic symptom and not likely that is cervical spinal stenosis but discussed that cervical spine changes can occur in RA     Arthritis is well managed with enbrel in that he does not have any persistent pain     Thinks that the enbrel is not making his body feel right on the day of injection;  something has pinched his skin somewhere that is not where he gets an injection - side of head - spasm - and may be a spot where has psoriasis.     May be in legs and may be in check.      Because he is on a TNFalpha inhibitor and that it can cause demyelination would recommend getting a brain and cervical spine mri with and without contrast. If there are demyelinating changes would recommend a consultation with MS colleagues.      A pending ms consultation was placed.     His symptoms of cramping are better  He  Is back on the short acting levodopa and may try the extended release again only in the morning using the 25/100 CR tablet     Reviewed his brain and neck mri which are fine.      Discussed reaching out to  Us via  "[email or web address]     He will be meeting with dr rossana figueroa rheumatology next week to review his TNF alpha inhibitor regimen    MRI done 8/5/20[place] after \"passing out\"  Was given keppra and took it for a week and then stopped  Did not get an EEG  May have had alcohol related event/passing out.     Had been on ketamine, xanax, alcohol  Was doing nitrous -whippet  Had numbness in the past and has some numbness  Doing alcohol and marijuana presently    On b12  8/19/2023 B12 was lowish 199  Folate           Medications      10a Pm?   Alprazolam Xanax 0.5mg no    Amitriptyline elavil 50mg no    Aspirin 325mg  no     Augmented betamethasone dipropionate diprolene   prn     Calcipotriene Dovonox 0.005% ointment  ?     Carbidopa/levodopa Sinemet 25/100  CR no     Carbidopa/levodopa sinemet 25/100 1  1    Clobetasol temovate 0.05% external cream topical     Cyanocobalamin Vit B12 1000mcg dose? 1    Docosahexanoic acid EPA below      Etanercept enbrel 50mg/ml autoinjector 7 days     Fish oil - krill oil 1000mg 1 gram astaxanthin 1     Folic acid folvite 1mg - using methylfolate 800mcg below    Gabapentin neurontin 300mg   no   Hydrocortisone 2.5% ointment See rx     Hydroxyzine atarax 25mg  prn      Levetiracetam keppra 750mg no    Levocetirizine xyzal 5mg  no     Magnesium vitamin D supplement 250mg 1     Medical cannabis using     Methocabamol robaxin prn    Metronidazole metrocream 0.75% cream  using     Mometasone elocon 0.1% external cream topical     MVI  1      Naproxen naprosyn 500mg  no     Nonformulary methylfolate 800mcg yes above   Nonformulary cannabis yes    Nystatin mycostatin 100,000 unit/ml susp     Pimecrolimus elidel 1% cream  no     Tacrolimus protopic 0.03% ointment  no     Terbinafine lamisil 1% cream   no               Plan:    Blood work   Cbc to check MCV  He could have nitrous oxide related megaloblastic anemia  RBC folate  B12 and MMA    Refilled sinemet 25/100 2/day to [facility] on " [facility]    Return in one year    Updated medication table above.     Coding statement:   Medical Decision Making:  #  Chronic progressive medical conditions addressed  - see above --   Review and/or interpretation of unique test or documentation from a provider outside of neurology no   Independent historian provided additional details  no I  Prescription drug management and review of potential side effects and/or monitoring for side effects  -- see above ---  Health impacted by social determinants of health  no    I have reviewed the note as documented above.  This accurately captures the substance of my conversation with the patient and total time spent preparing for visit, executing visit and completing visit on the day of the visit:  30 minutes.  The portion of this total time included face to face time 21 minutes    Long Henry MD     ______________________________________    Last visit date and details:             ______________________________________      Patient was asked about 14 Review of systems including changes in vision (dry eyes, double vision), hearing, heart, lungs, musculoskeletal, depression, anxiety, snoring, RBD, insomnia, urinary frequency, urinary urgency, constipation, swallowing problems, hematological, ID, allergies, skin problems: seborrhea, endocrinological: thyroid, diabetes, cholesterol; balance, weight changes, and other neurological problems and these were not significant at this time except for   Patient Active Problem List   Diagnosis    DOPA responsive dystonia    Depression    Dystonia    TB lung, latent    Vitamin D deficiency, unspecified    History of COVID-19    Psoriatic arthritis (H)    Chalazion of left upper eyelid    Tinnitus of both ears    Psoriasis        No Known Allergies  Past Surgical History:   Procedure Laterality Date    KNEE SURGERY Right 04/22/2021    meniscus removal and repair     Past Medical History:   Diagnosis Date    Depression     DOPA responsive  dystonia 11/1/2011    History of COVID-19 6/11/2021    Psoriatic arthritis (H) 6/11/2021     Social History     Socioeconomic History    Marital status: Single     Spouse name: Not on file    Number of children: Not on file    Years of education: Not on file    Highest education level: Not on file   Occupational History    Occupation: Inventory Management     Comment: [employer]   Tobacco Use    Smoking status: Some Days     Packs/day: 0.25     Types: Cigarettes    Smokeless tobacco: Never    Tobacco comments:     Quit in 2007. Smoked occasionally. Smoked for 6 months.   Substance and Sexual Activity    Alcohol use: Yes     Comment: Drinks occaionally.     Drug use: Yes     Types: Marijuana     Comment: medical cannabis    Sexual activity: Not on file   Other Topics Concern    Parent/sibling w/ CABG, MI or angioplasty before 65F 55M? Not Asked   Social History Narrative    Quit smoking in 2007. He only smoked for 6 months occasionally. Drinks alcohol occaionally. Lives by himself in [facility] in [place].         Going      Social Determinants of Health     Financial Resource Strain: Not on file   Food Insecurity: Not on file   Transportation Needs: Not on file   Physical Activity: Not on file   Stress: Not on file   Social Connections: Not on file   Interpersonal Safety: Not on file   Housing Stability: Not on file       Drug and lactation database from the [place] [employer] of Medicine:  http://toxnet.nlm.nih.gov/cgi-bin/sis/htmlgen?LACT      B/P: Data Unavailable, T: Data Unavailable, P: Data Unavailable, R: Data Unavailable 0 lbs 0 oz  There were no vitals taken for this visit., There is no height or weight on file to calculate BMI.  Medications and Vitals not listed above were documented in the cart and reviewed by me.     Current Outpatient Medications   Medication Sig Dispense Refill    ALPRAZolam (XANAX) 0.5 MG tablet       amitriptyline (ELAVIL) 50 MG  tablet Take 50 mg by mouth nightly as needed      calcipotriene (DOVONOX) 0.005 % external ointment Apply topically to affected area(s).      cyanocobalamin (VITAMIN B-12) 1000 MCG tablet       folic acid (FOLVITE) 1 MG tablet       levETIRAcetam (KEPPRA) 750 MG tablet Take 750 mg by mouth 2 times daily      methocarbamol (ROBAXIN) 750 MG tablet Take 750 mg by mouth every 6 hours as needed      NONFORMULARY Patient Sig: Medical Cannabis (patient's own supply)  See Admin Instructions (The purpose of this order is to document that the patient reports taking medical cannabis.  This is not a prescription, and is not used to certify that the patient has a qualifying medical condition.)      nystatin (MYCOSTATIN) 090045 UNIT/ML suspension Take 500,000 Units by mouth 4 times daily      augmented betamethasone dipropionate (DIPROLENE-AF) 0.05 % external cream Apply topically daily as needed (Hands)      carbamide peroxide (DEBROX) 6.5 % otic solution Place 5 drops Into the left ear 2 times daily 15 mL 0    carbidopa-levodopa (SINEMET CR)  MG CR tablet 1 tab by mouth every morning. 30 tablet 0    carbidopa-levodopa (SINEMET)  MG tablet Take 1 tablet by mouth 2 times daily 60 tablet 0    clobetasol (TEMOVATE) 0.05 % external cream Apply topically daily as needed (Body)      etanercept (ENBREL SURECLICK) 50 MG/ML autoinjector Inject 50 mg Subcutaneous every 7 days      fish oil-omega-3 fatty acids 1000 MG capsule Take 1 g by mouth daily      gabapentin (NEURONTIN) 300 MG capsule Take 1 capsule (300 mg) by mouth nightly as needed 30 capsule 11    hydrocortisone 2.5 % ointment Apply thinly to delicate skin psoriasis TWICE A DAY, Monday-Fridays, off on weekends.      hydrOXYzine (ATARAX) 25 MG tablet Take 25 mg by mouth every 6 hours as needed for anxiety      levocetirizine (XYZAL) 5 MG tablet Take 5 mg by mouth daily as needed for other (itching)      magnesium 250 MG tablet Take 1 tablet by mouth daily      medical  cannabis (Patient's own supply) See Admin Instructions (The purpose of this order is to document that the patient reports taking medical cannabis.  This is not a prescription, and is not used to certify that the patient has a qualifying medical condition.)      metroNIDAZOLE (METROCREAM) 0.75 % external cream Apply topically 2 times daily (Rosacea)      mometasone (ELOCON) 0.1 % external cream Apply topically daily as needed (Head and groin)      multivitamin w/minerals (THERA-VIT-M) tablet Take 1 tablet by mouth daily      naproxen (NAPROSYN) 500 MG tablet Take 500 mg by mouth daily as needed           Long Henry MD

## 2023-10-27 ENCOUNTER — OFFICE VISIT (OUTPATIENT)
Dept: NEUROLOGY | Facility: CLINIC | Age: 38
End: 2023-10-27
Payer: COMMERCIAL

## 2023-10-27 ENCOUNTER — LAB (OUTPATIENT)
Dept: LAB | Facility: CLINIC | Age: 38
End: 2023-10-27
Payer: COMMERCIAL

## 2023-10-27 VITALS
OXYGEN SATURATION: 96 % | DIASTOLIC BLOOD PRESSURE: 82 MMHG | HEART RATE: 74 BPM | BODY MASS INDEX: 24.51 KG/M2 | HEIGHT: 74 IN | SYSTOLIC BLOOD PRESSURE: 128 MMHG | WEIGHT: 191 LBS

## 2023-10-27 DIAGNOSIS — D53.1: ICD-10-CM

## 2023-10-27 DIAGNOSIS — E53.8 VITAMIN B12 DEFICIENCY (NON ANEMIC): ICD-10-CM

## 2023-10-27 DIAGNOSIS — T41.0X5A: ICD-10-CM

## 2023-10-27 DIAGNOSIS — E53.8 FOLATE DEFICIENCY: ICD-10-CM

## 2023-10-27 DIAGNOSIS — D75.89 INCREASED MCV: ICD-10-CM

## 2023-10-27 DIAGNOSIS — G24.1 DOPA RESPONSIVE DYSTONIA: Primary | ICD-10-CM

## 2023-10-27 PROBLEM — F16.20: Status: ACTIVE | Noted: 2023-08-22

## 2023-10-27 LAB
BASOPHILS # BLD AUTO: 0 10E3/UL (ref 0–0.2)
BASOPHILS NFR BLD AUTO: 1 %
EOSINOPHIL # BLD AUTO: 0.2 10E3/UL (ref 0–0.7)
EOSINOPHIL NFR BLD AUTO: 3 %
ERYTHROCYTE [DISTWIDTH] IN BLOOD BY AUTOMATED COUNT: 12.8 % (ref 10–15)
HCT VFR BLD AUTO: 38 % (ref 40–53)
HGB BLD-MCNC: 13.8 G/DL (ref 13.3–17.7)
IMM GRANULOCYTES # BLD: 0 10E3/UL
IMM GRANULOCYTES NFR BLD: 1 %
LYMPHOCYTES # BLD AUTO: 2.5 10E3/UL (ref 0.8–5.3)
LYMPHOCYTES NFR BLD AUTO: 40 %
MCH RBC QN AUTO: 32.7 PG (ref 26.5–33)
MCHC RBC AUTO-ENTMCNC: 36.3 G/DL (ref 31.5–36.5)
MCV RBC AUTO: 90 FL (ref 78–100)
MONOCYTES # BLD AUTO: 0.6 10E3/UL (ref 0–1.3)
MONOCYTES NFR BLD AUTO: 9 %
NEUTROPHILS # BLD AUTO: 3 10E3/UL (ref 1.6–8.3)
NEUTROPHILS NFR BLD AUTO: 46 %
NRBC # BLD AUTO: 0 10E3/UL
NRBC BLD AUTO-RTO: 0 /100
PLATELET # BLD AUTO: 207 10E3/UL (ref 150–450)
RBC # BLD AUTO: 4.22 10E6/UL (ref 4.4–5.9)
VIT B12 SERPL-MCNC: 505 PG/ML (ref 232–1245)
WBC # BLD AUTO: 6.3 10E3/UL (ref 4–11)

## 2023-10-27 PROCEDURE — 85025 COMPLETE CBC W/AUTO DIFF WBC: CPT | Performed by: PATHOLOGY

## 2023-10-27 PROCEDURE — 82747 ASSAY OF FOLIC ACID RBC: CPT | Mod: 90 | Performed by: PATHOLOGY

## 2023-10-27 PROCEDURE — 82607 VITAMIN B-12: CPT | Performed by: PSYCHIATRY & NEUROLOGY

## 2023-10-27 PROCEDURE — 36415 COLL VENOUS BLD VENIPUNCTURE: CPT | Performed by: PATHOLOGY

## 2023-10-27 PROCEDURE — 99000 SPECIMEN HANDLING OFFICE-LAB: CPT | Performed by: PATHOLOGY

## 2023-10-27 PROCEDURE — 99214 OFFICE O/P EST MOD 30 MIN: CPT | Performed by: PSYCHIATRY & NEUROLOGY

## 2023-10-27 PROCEDURE — 83921 ORGANIC ACID SINGLE QUANT: CPT | Performed by: PSYCHIATRY & NEUROLOGY

## 2023-10-27 RX ORDER — CARBIDOPA AND LEVODOPA 25; 100 MG/1; MG/1
1 TABLET ORAL 2 TIMES DAILY
Qty: 180 TABLET | Refills: 3 | Status: SHIPPED | OUTPATIENT
Start: 2023-10-27

## 2023-10-27 RX ORDER — HYDROCODONE BITARTRATE AND ACETAMINOPHEN 10; 325 MG/1; MG/1
TABLET ORAL
COMMUNITY
Start: 2023-08-22 | End: 2023-10-27

## 2023-10-27 ASSESSMENT — PAIN SCALES - GENERAL: PAINLEVEL: NO PAIN (0)

## 2023-10-27 NOTE — LETTER
"10/27/2023       RE: Tres Ospina  [address]  [facility] [address]     Dear Colleague,    Thank you for referring your patient, Tres Ospina, to the [facility] NEUROLOGY [facility]INIC [place] at [facility]. Please see a copy of my visit note below.        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  (primary encounter diagnosis)     Carbidopa/levodopa Sinemet 25/100 @ 10am and 10pm  Has  Involuntary movements after some of the evening doses  Tried CR and then went back on short acting 25/100 sinemet        Brain MRI 2022  Cervical MRI 2022     Scans were okay         Review of diagnosis    DRD     Avoidance of dopamine blockers         Motor complication review         Review of Impulse control disorders         Review of surgical or medication options         Gait/Balance/Falls         Exercise/Therapy performed/offered   Doing some exercise; walks dog        Cognitive/Driving   No cognitive problems  Driving bit     Mood   Panic attack while flight from [place] was messed up  He had some involuntary movements in feet and shoulders  A \"trigger\" in his body     Mood is \"pretty good\"  Unemployed presently  Living with rooming     Hallucinations/delusions   Sensation of water dripping     Sleep   jerkiness     Bladder/Renal/Prostate/Gyn/Other  No change     GI/Constipation/GERD   Elevated ALT (borderline)     ENDO/Lipid/DM/Bone density/Thyroid  No clear problems     Cardio/heart/Hyper or Hypotensive   Had elevated blood pressure reading in chart     Vision/Dry Eyes/Cataracts/Glaucoma/Macular   Wears glasses  Uveitis   "   Heme/Anticoagulation/Antiplatelet/Anemia/Other  Not anemic  No elevated white blood cell count     ENT/Resp     Skin/Cancer/Seborrhea/other  Rosacea     Musculoskeletal/Pain/Headache  Cervical Spine MRI 6/11/2020  1.  Multilevel cervical spondylosis.   2.  No high-grade spinal canal stenosis. No abnormal cord signal.   3.  Moderate neural foraminal stenosis on the left at C3-C4 and on the right at C5-C6.     Psoriatic arthritis - last feb 2020  Started with pain in his upper spine   Given prednisone and then developed psoriasis  Prior right knee injury 2015 volleyball  Developed right knee swelling after biking  surgery of right knee 4/22/2021     Rheumatologist  Dr. 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        Other:  Latent TB and completed treatment on rifampin for 4 months from   Beginning of November till march 2021   He had a TB test prior to getting enbrel and so had to get tb treatment prior to enbrel     COVID19 and covid19 related rash  May 6th 2021 diagnosed    Has some spinal midline symptoms that are jolt like during the night - last neck  Discussed that it is like a myoclonic symptom and not likely that is cervical spinal stenosis but discussed that cervical spine changes can occur in RA     Arthritis is well managed with enbrel in that he does not have any persistent pain     Thinks that the enbrel is not making his body feel right on the day of injection;  something has pinched his skin somewhere that is not where he gets an injection - side of head - spasm - and may be a spot where has psoriasis.     May be in legs and may be in check.      Because he is on a TNFalpha inhibitor and that it can cause demyelination would recommend getting a brain and cervical spine mri with and without contrast. If there are demyelinating changes  "would recommend a consultation with MS colleagues.      A pending ms consultation was placed.     His symptoms of cramping are better  He  Is back on the short acting levodopa and may try the extended release again only in the morning using the 25/100 CR tablet     Reviewed his brain and neck mri which are fine.      Discussed reaching out to  Us via [employer]     He will be meeting with dr rossana figueroa rheumatology next week to review his TNF alpha inhibitor regimen    MRI done 8/5/20[place] after \"passing out\"  Was given keppra and took it for a week and then stopped  Did not get an EEG  May have had alcohol related event/passing out.     Had been on ketamine, xanax, alcohol  Was doing nitrous -whippet  Had numbness in the past and has some numbness  Doing alcohol and marijuana presently    On b12  8/19/2023 B12 was lowish 199  Folate           Medications      10a Pm?   Alprazolam Xanax 0.5mg no    Amitriptyline elavil 50mg no    Aspirin 325mg  no     Augmented betamethasone dipropionate diprolene   prn     Calcipotriene Dovonox 0.005% ointment  ?     Carbidopa/levodopa Sinemet 25/100  CR no     Carbidopa/levodopa sinemet 25/100 1  1    Clobetasol temovate 0.05% external cream topical     Cyanocobalamin Vit B12 1000mcg dose? 1    Docosahexanoic acid EPA below      Etanercept enbrel 50mg/ml autoinjector 7 days     Fish oil - krill oil 1000mg 1 gram astaxanthin 1     Folic acid folvite 1mg - using methylfolate 800mcg below    Gabapentin neurontin 300mg   no   Hydrocortisone 2.5% ointment See rx     Hydroxyzine atarax 25mg  prn      Levetiracetam keppra 750mg no    Levocetirizine xyzal 5mg  no     Magnesium vitamin D supplement 250mg 1     Medical cannabis using     Methocabamol robaxin prn    Metronidazole metrocream 0.75% cream  using     Mometasone elocon 0.1% external cream topical     MVI  1      Naproxen naprosyn 500mg  no     Nonformulary methylfolate 800mcg yes above   Nonformulary cannabis yes    Nystatin " mycostatin 100,000 unit/ml susp     Pimecrolimus elidel 1% cream  no     Tacrolimus protopic 0.03% ointment  no     Terbinafine lamisil 1% cream   no               Plan:    Blood work   Cbc to check MCV  He could have nitrous oxide related megaloblastic anemia  RBC folate  B12 and MMA    Refilled sinemet 25/100 2/day to [facility]    Return in one year    Updated medication table above.     Coding statement:   Medical Decision Making:  #  Chronic progressive medical conditions addressed  - see above --   Review and/or interpretation of unique test or documentation from a provider outside of neurology no   Independent historian provided additional details  no I  Prescription drug management and review of potential side effects and/or monitoring for side effects  -- see above ---  Health impacted by social determinants of health  no    I have reviewed the note as documented above.  This accurately captures the substance of my conversation with the patient and total time spent preparing for visit, executing visit and completing visit on the day of the visit:  30 minutes.  The portion of this total time included face to face time 21 minutes    Long Henry MD     ______________________________________    Last visit date and details:             ______________________________________      Patient was asked about 14 Review of systems including changes in vision (dry eyes, double vision), hearing, heart, lungs, musculoskeletal, depression, anxiety, snoring, RBD, insomnia, urinary frequency, urinary urgency, constipation, swallowing problems, hematological, ID, allergies, skin problems: seborrhea, endocrinological: thyroid, diabetes, cholesterol; balance, weight changes, and other neurological problems and these were not significant at this time except for   Patient Active Problem List   Diagnosis    DOPA responsive dystonia    Depression    Dystonia    TB lung, latent    Vitamin D deficiency, unspecified     History of COVID-19    Psoriatic arthritis (H)    Chalazion of left upper eyelid    Tinnitus of both ears    Psoriasis        No Known Allergies  Past Surgical History:   Procedure Laterality Date    KNEE SURGERY Right 04/22/2021    meniscus removal and repair     Past Medical History:   Diagnosis Date    Depression     DOPA responsive dystonia 11/1/2011    History of COVID-19 6/11/2021    Psoriatic arthritis (H) 6/11/2021     Social History     Socioeconomic History    Marital status: Single     Spouse name: Not on file    Number of children: Not on file    Years of education: Not on file    Highest education level: Not on file   Occupational History    Occupation: [employer]     Comment: [employer]   Tobacco Use    Smoking status: Some Days     Packs/day: 0.25     Types: Cigarettes    Smokeless tobacco: Never    Tobacco comments:     Quit in 2007. Smoked occasionally. Smoked for 6 months.   Substance and Sexual Activity    Alcohol use: Yes     Comment: Drinks occaionally.     Drug use: Yes     Types: Marijuana     Comment: medical cannabis    Sexual activity: Not on file   Other Topics Concern    Parent/sibling w/ CABG, MI or angioplasty before 65F 55M? Not Asked   Social History Narrative    Quit smoking in 2007. He only smoked for 6 months occasionally. Drinks alcohol occaionally. Lives by himself in [facility].         Going      Social Determinants of Health     Financial Resource Strain: Not on file   Food Insecurity: Not on file   Transportation Needs: Not on file   Physical Activity: Not on file   Stress: Not on file   Social Connections: Not on file   Interpersonal Safety: Not on file   Housing Stability: Not on file       Drug and lactation database from the [place] National Library of Medicine:  http://toxnet.nlm.nih.gov/cgi-bin/sis/htmlgen?LACT      B/P: Data Unavailable, T: Data Unavailable, P: Data Unavailable, R: Data Unavailable 0  lbs 0 oz  There were no vitals taken for this visit., There is no height or weight on file to calculate BMI.  Medications and Vitals not listed above were documented in the cart and reviewed by me.     Current Outpatient Medications   Medication Sig Dispense Refill    ALPRAZolam (XANAX) 0.5 MG tablet       amitriptyline (ELAVIL) 50 MG tablet Take 50 mg by mouth nightly as needed      calcipotriene (DOVONOX) 0.005 % external ointment Apply topically to affected area(s).      cyanocobalamin (VITAMIN B-12) 1000 MCG tablet       folic acid (FOLVITE) 1 MG tablet       levETIRAcetam (KEPPRA) 750 MG tablet Take 750 mg by mouth 2 times daily      methocarbamol (ROBAXIN) 750 MG tablet Take 750 mg by mouth every 6 hours as needed      NONFORMULARY Patient Sig: Medical Cannabis (patient's own supply)  See Admin Instructions (The purpose of this order is to document that the patient reports taking medical cannabis.  This is not a prescription, and is not used to certify that the patient has a qualifying medical condition.)      nystatin (MYCOSTATIN) 584615 UNIT/ML suspension Take 500,000 Units by mouth 4 times daily      augmented betamethasone dipropionate (DIPROLENE-AF) 0.05 % external cream Apply topically daily as needed (Hands)      carbamide peroxide (DEBROX) 6.5 % otic solution Place 5 drops Into the left ear 2 times daily 15 mL 0    carbidopa-levodopa (SINEMET CR)  MG CR tablet 1 tab by mouth every morning. 30 tablet 0    carbidopa-levodopa (SINEMET)  MG tablet Take 1 tablet by mouth 2 times daily 60 tablet 0    clobetasol (TEMOVATE) 0.05 % external cream Apply topically daily as needed (Body)      etanercept (ENBREL SURECLICK) 50 MG/ML autoinjector Inject 50 mg Subcutaneous every 7 days      fish oil-omega-3 fatty acids 1000 MG capsule Take 1 g by mouth daily      gabapentin (NEURONTIN) 300 MG capsule Take 1 capsule (300 mg) by mouth nightly as needed 30 capsule 11    hydrocortisone 2.5 % ointment Apply  thinly to delicate skin psoriasis TWICE A DAY, Monday-Fridays, off on weekends.      hydrOXYzine (ATARAX) 25 MG tablet Take 25 mg by mouth every 6 hours as needed for anxiety      levocetirizine (XYZAL) 5 MG tablet Take 5 mg by mouth daily as needed for other (itching)      magnesium 250 MG tablet Take 1 tablet by mouth daily      medical cannabis (Patient's own supply) See Admin Instructions (The purpose of this order is to document that the patient reports taking medical cannabis.  This is not a prescription, and is not used to certify that the patient has a qualifying medical condition.)      metroNIDAZOLE (METROCREAM) 0.75 % external cream Apply topically 2 times daily (Rosacea)      mometasone (ELOCON) 0.1 % external cream Apply topically daily as needed (Head and groin)      multivitamin w/minerals (THERA-VIT-M) tablet Take 1 tablet by mouth daily      naproxen (NAPROSYN) 500 MG tablet Take 500 mg by mouth daily as needed           Long Henry MD

## 2023-10-27 NOTE — PATIENT INSTRUCTIONS
Medications      10a Pm?   Alprazolam Xanax 0.5mg no    Amitriptyline elavil 50mg no    Aspirin 325mg  no     Augmented betamethasone dipropionate diprolene   prn     Calcipotriene Dovonox 0.005% ointment  ?     Carbidopa/levodopa Sinemet 25/100  CR no     Carbidopa/levodopa sinemet 25/100 1  1    Clobetasol temovate 0.05% external cream topical     Cyanocobalamin Vit B12 1000mcg dose? 1    Docosahexanoic acid EPA below      Etanercept enbrel 50mg/ml autoinjector 7 days     Fish oil - krill oil 1000mg 1 gram astaxanthin 1     Folic acid folvite 1mg - using methylfolate 800mcg below    Gabapentin neurontin 300mg   no   Hydrocortisone 2.5% ointment See rx     Hydroxyzine atarax 25mg  prn      Levetiracetam keppra 750mg no    Levocetirizine xyzal 5mg  no     Magnesium vitamin D supplement 250mg 1     Medical cannabis using     Methocabamol robaxin prn    Metronidazole metrocream 0.75% cream  using     Mometasone elocon 0.1% external cream topical     MVI  1      Naproxen naprosyn 500mg  no     Nonformulary methylfolate 800mcg yes above   Nonformulary cannabis yes    Nystatin mycostatin 100,000 unit/ml susp     Pimecrolimus elidel 1% cream  no     Tacrolimus protopic 0.03% ointment  no     Terbinafine lamisil 1% cream   no               Plan:    Blood work   Cbc to check MCV  RBC folate  B12 and MMA    Refilled sinemet 25/100 2/day to [facility]    Return in one year    Updated medication table above.

## 2023-10-28 LAB — FOLATE RBC-MCNC: 822 NG/ML

## 2023-11-01 LAB — METHYLMALONATE SERPL-SCNC: 0.6 UMOL/L (ref 0–0.4)

## 2023-11-24 DIAGNOSIS — G62.9 NEUROPATHY: Primary | ICD-10-CM

## 2023-11-24 RX ORDER — KETOCONAZOLE 20 MG/G
CREAM TOPICAL
COMMUNITY
Start: 2023-10-30

## 2023-11-28 NOTE — TELEPHONE ENCOUNTER
RECORDS RECEIVED FROM: internal   REASON FOR VISIT: Neuropathy    Date of Appt: 1/22/24   NOTES (FOR ALL VISITS) STATUS DETAILS   OFFICE NOTE from referring provider Internal Dr Long Henry @ [facility] Neurology:  10/27/23  4/29/22  4/5/22   DISCHARGE REPORT from the ER Internal [facility] [place]:  8/13/23    [facility]:  12/23/22   MEDICATION LIST Internal    IMAGING  (FOR ALL VISITS)     MRI (HEAD, NECK, SPINE) Internal [facility]:  MRI Brain 4/22/22  MRI Cervical Spine 4/22/22   CT (HEAD, NECK, SPINE) Internal [facility]:  CT Cervical Spine 1/26/23  CT Head 1/26/23

## 2024-01-22 ENCOUNTER — OFFICE VISIT (OUTPATIENT)
Dept: NEUROLOGY | Facility: CLINIC | Age: 39
End: 2024-01-22
Attending: PSYCHIATRY & NEUROLOGY
Payer: COMMERCIAL

## 2024-01-22 ENCOUNTER — PRE VISIT (OUTPATIENT)
Dept: NEUROLOGY | Facility: CLINIC | Age: 39
End: 2024-01-22

## 2024-01-22 VITALS
OXYGEN SATURATION: 96 % | SYSTOLIC BLOOD PRESSURE: 132 MMHG | HEART RATE: 65 BPM | BODY MASS INDEX: 27.18 KG/M2 | DIASTOLIC BLOOD PRESSURE: 86 MMHG | WEIGHT: 218.6 LBS | HEIGHT: 75 IN

## 2024-01-22 DIAGNOSIS — G32.0 SUBACUTE COMBINED DEGENERATION (H): Primary | ICD-10-CM

## 2024-01-22 DIAGNOSIS — E53.8 SUBACUTE COMBINED DEGENERATION (H): Primary | ICD-10-CM

## 2024-01-22 PROCEDURE — 99215 OFFICE O/P EST HI 40 MIN: CPT | Performed by: PSYCHIATRY & NEUROLOGY

## 2024-01-22 ASSESSMENT — PAIN SCALES - GENERAL: PAINLEVEL: MILD PAIN (2)

## 2024-01-22 NOTE — PROGRESS NOTES
"Chief Complaint:numbness in legs    History of Present Illness:    Tres Ospina is a 38 year old man who I am seeing at the request of Dr. Henry. 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 He referral for today's appoinment is for \"neuropathy\". He endorses numbness in both legs below the knees. Onset was about 1 year ago. Onset occurred in the context of heavy nitrous use. Over about 1 week the sensory symptoms ramped up to affect him below the waist. Since sky he has continued to use nitrous, but not nearly as much. His symptoms have improved but are still bothersome. His balance is poor. He walks unassisted but is not steady. No bowel and bladder changes. No pain. Strength in his legs is ok. He has mild numbness in his fingers, but transient. He currently takes B12 supplements. He takes Enbrel for psoriatic arthritis. He has taken Enbrel for 2-3 years.     Prior pertinent laboratory work-up:  8/19/23: B12 low at 199  10/27/23: B12 505    Prior electrophysiologic work-up:  None    Past Medical History:   Past Medical History:   Diagnosis Date    Acute megaloblastic anemia due to nitrous oxide 10/27/2023    Depression     DOPA responsive dystonia 11/01/2011    History of COVID-19 06/11/2021    Psoriatic arthritis (H) 06/11/2021     Past Surgical History:  Past Surgical History:   Procedure Laterality Date    KNEE SURGERY Right 04/22/2021    meniscus removal and repair     Family history:    There is no known family history of hereditary neuropathies or other neuromuscular disorders.    Social History:    Nitrous/ketamine abuse    Medical Allergies:   No Known Allergies    Current Medications:    Current Outpatient Medications   Medication    augmented betamethasone dipropionate (DIPROLENE-AF) 0.05 % external cream    calcipotriene (DOVONOX) 0.005 % external ointment    carbidopa-levodopa (SINEMET)  MG tablet    clobetasol (TEMOVATE) 0.05 % external cream    cyanocobalamin (VITAMIN B-12) " "1000 MCG tablet    etanercept (ENBREL SURECLICK) 50 MG/ML autoinjector    fish oil-omega-3 fatty acids 1000 MG capsule    hydrocortisone 2.5 % ointment    hydrOXYzine (ATARAX) 25 MG tablet    ketoconazole (NIZORAL) 2 % external cream    magnesium 250 MG tablet    medical cannabis (Patient's own supply)    methocarbamol (ROBAXIN) 750 MG tablet    metroNIDAZOLE (METROCREAM) 0.75 % external cream    mometasone (ELOCON) 0.1 % external cream    multivitamin w/minerals (THERA-VIT-M) tablet    NONFORMULARY    NONFORMULARY     No current facility-administered medications for this visit.     Review of Systems: A complete review of systems was obtained and was negative except for what was noted above.    Physical examination:    /86 (BP Location: Left arm, Patient Position: Sitting, Cuff Size: Adult Regular)   Pulse 65   Ht 1.913 m (6' 3.32\")   Wt 99.2 kg (218 lb 9.6 oz)   SpO2 96%   BMI 27.10 kg/m       General Appearance: NAD    Skin: There are no rashes or other skin lesions.    Musculoskeletal:  Pes cavus present    Neurologic examination:    Mental status:  Patient is alert, attentive, and oriented x 3.  Language is coherent and fluent without aphasia.  Memory, comprehension and ability to follow commands were intact.       Cranial nerves:  Pupils were round and reacted to light.  Extraocular movements were full. There was no face, jaw, palate or tongue weakness or atrophy. Hearing was grossly intact.  Shoulder shrug was normal.      Motor exam: No atrophy or fasciculations.  Manual muscle testing revealed the following MRC grade muscle power:   Right Left   Neck flexion 5    Neck extension: 5    Shoulder abduction:  5 5   Elbow extension: 5 5   Elbow flexion:  5 5   Wrist flexion:  5 5   Wrist extension:  5 5   Finger extension 5 5   FDI 4 4   Hip flexion 5 5   Knee flexion 5 5   Knee extension 5 5   Dorsiflexion 4 4   Plantar flexion 5 5     Complex motor skills: No tremor or ataxia    Sensory exam: " Vibration reduced to about 5 seconds in toes. Pin intact in toes and hands.     Gait: Narrow and stable but slightly unstable with some inversion of right foot.      Deep tendon reflexes:   Right Left   Triceps 3 3   Biceps 3 3   Brachioradialis 2 2   Knee jerk 3 3   Ankle jerk 3 2   Plantar responses were flexor bilaterally.  Toes extension on right and flexor on left. Jaw jerk normal.      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 N2O oxidizes the cobalt atom integral to vitamin B12 function. Although low B12 levels are sometimes observed during the acute period, not always. Nitrous may lead to vitamin B12 inactivation and functional deficiency even in the presence of normals B12 levels. I am going to imaging of his C and T spine to look for other explanations of myelopathy, but I suspect nitrous oxide-induced subacute combined degeneration to be by far the most likely explanation. Management of this condition is supportive, as detailed below. We discussed prognosis of his condition. With continued nitrous use the condition will worsen, potentially in a devastating and irreversible way. Should he stop nitrous abuse then he may appreciated some improvement over the next 12 months, but in some cases complete improvement is not possible. All questions answered.     Plan:      1. Abstain completely and totally from nitrous  2. MRI C and T spine   3. NCS to characterize severity of polyneuropathy  4. Encouraged continued B12 supplementation and healthy well balanced nutrition. If he abstains from nitrous and has healthy eating then long term B12 supplementation may not be needed  5. PT referral for gait and balance  6. Encouraged supportive footwear and frequent foot inspection  7. Follow up 6 months  ---  2/15/24: MRI C/T spine showed no abnormal cord signal or enhancement. Multilevel mild cervical spondylosis, most pronounced with  "severe right C5-6 without foraminal stenosis. No high-grade spinal canal stenosis. These images provide no evidence of an alternative explanation for his myelopathy. Will let him know by Vera.    3/29/24: Note from patient. He had a NO use relapse and was in the ER. Now reports his right arm and leg are \"slumping\". Will obtain MRI brain and C spine to make sure no new B12 associated or other lesions  "

## 2024-01-22 NOTE — LETTER
"1/22/2024       RE: Tres Ospina  [address]  [facility] [address]       Dear Colleague,    Thank you for referring your patient, Tres Ospina, to the [facility] NEUROLOGY [facility] [place] at [facility]. Please see a copy of my visit note below.    Chief Complaint:numbness in legs    History of Present Illness:    Tres Ospina is a 38 year old man who I am seeing at the request of Dr. Henry. 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 He referral for today's appoinment is for \"neuropathy\". He endorses numbness in both legs below the knees. Onset was about 1 year ago. Onset occurred in the context of heavy nitrous use. Over about 1 week the sensory symptoms ramped up to affect him below the waist. Since sky he has continued to use nitrous, but not nearly as much. His symptoms have improved but are still bothersome. His balance is poor. He walks unassisted but is not steady. No bowel and bladder changes. No pain. Strength in his legs is ok. He has mild numbness in his fingers, but transient. He currently takes B12 supplements. He takes Enbrel for psoriatic arthritis. He has taken Enbrel for 2-3 years.     Prior pertinent laboratory work-up:  8/19/23: B12 low at 199  10/27/23: B12 505    Prior electrophysiologic work-up:  None    Past Medical History:   Past Medical History:   Diagnosis Date    Acute megaloblastic anemia due to nitrous oxide 10/27/2023    Depression     DOPA responsive dystonia 11/01/2011    History of COVID-19 06/11/2021    Psoriatic arthritis (H) 06/11/2021     Past Surgical History:  Past Surgical History:   Procedure Laterality Date    KNEE SURGERY Right 04/22/2021    meniscus removal and repair     Family history:    There is no known family history of hereditary neuropathies or other neuromuscular disorders.    Social History:    Nitrous/ketamine abuse    Medical Allergies:   No Known " "Allergies    Current Medications:    Current Outpatient Medications   Medication    augmented betamethasone dipropionate (DIPROLENE-AF) 0.05 % external cream    calcipotriene (DOVONOX) 0.005 % external ointment    carbidopa-levodopa (SINEMET)  MG tablet    clobetasol (TEMOVATE) 0.05 % external cream    cyanocobalamin (VITAMIN B-12) 1000 MCG tablet    etanercept (ENBREL SURECLICK) 50 MG/ML autoinjector    fish oil-omega-3 fatty acids 1000 MG capsule    hydrocortisone 2.5 % ointment    hydrOXYzine (ATARAX) 25 MG tablet    ketoconazole (NIZORAL) 2 % external cream    magnesium 250 MG tablet    medical cannabis (Patient's own supply)    methocarbamol (ROBAXIN) 750 MG tablet    metroNIDAZOLE (METROCREAM) 0.75 % external cream    mometasone (ELOCON) 0.1 % external cream    multivitamin w/minerals (THERA-VIT-M) tablet    NONFORMULARY    NONFORMULARY     No current facility-administered medications for this visit.     Review of Systems: A complete review of systems was obtained and was negative except for what was noted above.    Physical examination:    /86 (BP Location: Left arm, Patient Position: Sitting, Cuff Size: Adult Regular)   Pulse 65   Ht 1.913 m (6' 3.32\")   Wt 99.2 kg (218 lb 9.6 oz)   SpO2 96%   BMI 27.10 kg/m       General Appearance: NAD    Skin: There are no rashes or other skin lesions.    Musculoskeletal:  Pes cavus present    Neurologic examination:    Mental status:  Patient is alert, attentive, and oriented x 3.  Language is coherent and fluent without aphasia.  Memory, comprehension and ability to follow commands were intact.       Cranial nerves:  Pupils were round and reacted to light.  Extraocular movements were full. There was no face, jaw, palate or tongue weakness or atrophy. Hearing was grossly intact.  Shoulder shrug was normal.      Motor exam: No atrophy or fasciculations.  Manual muscle testing revealed the following MRC grade muscle power:   Right Left   Neck flexion 5  "   Neck extension: 5    Shoulder abduction:  5 5   Elbow extension: 5 5   Elbow flexion:  5 5   Wrist flexion:  5 5   Wrist extension:  5 5   Finger extension 5 5   FDI 4 4   Hip flexion 5 5   Knee flexion 5 5   Knee extension 5 5   Dorsiflexion 4 4   Plantar flexion 5 5     Complex motor skills: No tremor or ataxia    Sensory exam: Vibration reduced to about 5 seconds in toes. Pin intact in toes and hands.     Gait: Narrow and stable but slightly unstable with some inversion of right foot.      Deep tendon reflexes:   Right Left   Triceps 3 3   Biceps 3 3   Brachioradialis 2 2   Knee jerk 3 3   Ankle jerk 3 2   Plantar responses were flexor bilaterally.  Toes extension on right and flexor on left. Jaw jerk normal.      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 N2O oxidizes the cobalt atom integral to vitamin B12 function. Although low B12 levels are sometimes observed during the acute period, not always. Nitrous may lead to vitamin B12 inactivation and functional deficiency even in the presence of normals B12 levels. I am going to imaging of his C and T spine to look for other explanations of myelopathy, but I suspect nitrous oxide-induced subacute combined degeneration to be by far the most likely explanation. Management of this condition is supportive, as detailed below. We discussed prognosis of his condition. With continued nitrous use the condition will worsen, potentially in a devastating and irreversible way. Should he stop nitrous abuse then he may appreciated some improvement over the next 12 months, but in some cases complete improvement is not possible. All questions answered.     Plan:      1. Abstain completely and totally from nitrous  2. MRI C and T spine   3. NCS to characterize severity of polyneuropathy  4. Encouraged continued B12 supplementation and healthy well balanced nutrition. If he abstains from nitrous  and has healthy eating then long term B12 supplementation may not be needed  5. PT referral for gait and balance  6. Encouraged supportive footwear and frequent foot inspection  7. Follow up 6 months  ---      Again, thank you for allowing me to participate in the care of your patient.      Sincerely,    Po Yen MD

## 2024-01-22 NOTE — NURSING NOTE
Chief Complaint   Patient presents with    New Patient    NEUROPATHY     Vitals were taken and medications were reconciled.   Bernard Feliz, EMT  11:09 AM

## 2024-01-29 ENCOUNTER — OFFICE VISIT (OUTPATIENT)
Dept: NEUROLOGY | Facility: CLINIC | Age: 39
End: 2024-01-29
Attending: PSYCHIATRY & NEUROLOGY
Payer: COMMERCIAL

## 2024-01-29 DIAGNOSIS — G62.9 NEUROPATHY: Primary | ICD-10-CM

## 2024-01-29 PROCEDURE — 95912 NRV CNDJ TEST 11-12 STUDIES: CPT | Performed by: INTERNAL MEDICINE

## 2024-01-29 PROCEDURE — 95885 MUSC TST DONE W/NERV TST LIM: CPT | Mod: 59 | Performed by: INTERNAL MEDICINE

## 2024-01-29 PROCEDURE — 95886 MUSC TEST DONE W/N TEST COMP: CPT | Performed by: INTERNAL MEDICINE

## 2024-01-29 NOTE — PROGRESS NOTES
[facility]  Electrodiagnostic Laboratory                 Department of Neurology                                                                                                         Test Date:  2024    Patient: Tres Ospina : 1985 Physician: Manuel Valle MD   Sex: Male AGE: 38 year Ref Phys:    ID#: [id number]   Technician: Kristy Behling     History and Examination:  Patient is a 39 yo male with suspect subacute combined degeneration. EMG is ordered to evaluate severity of polyneuropathy.     Techniques:  Motor conduction studies were done with surface recording electrodes. Sensory conduction studies were performed with surface electrodes, unless indicated otherwise by (n), designating the use of subdermal recording electrodes. Temperature was monitored and recorded throughout the study. Upper extremities were maintained at a temperature of 32 degrees Centigrade or higher.  EMG was done with a concentric needle electrode.     Results:  Evaluation of the right/left Fibular (EDB) motor, the right/left Tibial (AHB) motor, the right Superficial Fibular sensory, the left sural sensory, and the right sural sensory nerves showed no response.  The right Ulnar (ADM) motor nerve showed decreased conduction velocity (Abv Elbow-Bel Elbow, 40 m/s).  All remaining nerves (as indicated in the following tables) were within normal limits.      All F Wave latencies were within normal limits.      Needle evaluation of the right Tibialis Anterior muscle showed increased insertional activity, moderately increased Fibs/PSW, moderately increased motor unit amplitude, moderately increased motor unit duration, and moderately reduced recruitment. The right gastrocnemius showed mild increase in Fibs/PSW, slightly increase motor unit amplitude/duration, and mildly reduced recruitment. The right Fibularis Longus muscle showed increased Fibs/PSW, slightly increased motor unit  amplitude, and slightly increased motor unit duration.  All remaining muscles (as indicated in the following table) showed no evidence of electrical instability.        Interpretation:  This is an abnormal examination. 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 Amp Diff Segment Distance Velocity Neg. Dur Neg Area Diff Temperature Comment   Site (ms) Norm (mV) Norm (%)  cm m/s Norm (ms) (%) ( C)    Right Dp Branch Fibular (TA) Motor   Fib Head 4.7  < 6.0 2.1 -      9.5  31.1    Pop Fossa 7.0  < 5.7 2.2 - 5 Pop Fossa-Fib Head 10 43 - 9.5 -2 31.1    Left Fibular (EDB) Motor   Ankle NR  < 6.0 NR  > 2.5  Ankle-EDB 8   NR  30.9    Right Fibular (EDB) Motor   Ankle NR  < 6.0 NR  > 2.5  Ankle-EDB 8   NR  30.6    Right Median (APB) Motor   Wrist 4.0  < 4.4 8.5  > 5.0  Wrist-APB 8   6.5  30.2    Elbow 9.3 - 7.6  > 5.0 -11 Elbow-Wrist 26 49  > 48 6.7 -7 30.3    Left Tibial (AHB) Motor   Ankle NR  < 6.5 NR  > 5.0  Ankle-AHB 8   NR  31.1    Right Tibial (AHB) Motor   Ankle NR  < 6.5 NR  > 5.0  Ankle-AHB 8   NR  30.8    Right Ulnar (ADM) Motor   Wrist 3.1  < 3.5 7.6  > 5.0  Wrist-ADM 8   6.3  30.3    Bel Elbow 7.9 - 6.6 - -13 Bel Elbow-Wrist 23 48  > 48 6.3 -8 30.3    Abv Elbow 10.4 - 6.3 - -5 Abv Elbow-Bel Elbow 10 40  > 48 6.7 2 30.3      Sensory Sites      Onset Lat Peak Lat Amp (O-P) Amp (P-P) Segment Distance Velocity Temperature Comment   Site ms (ms)  V Norm ( V)  cm m/s Norm ( C)    Right Median Sensory   Wrist-Dig II 2.6 3.8 13  > 10 22 Wrist-Dig II 14 54  > 48 30.8    Right Radial Sensory   Forearm-Wrist 2.1 2.8 15  > 15 29 Forearm-Wrist 10 48 - 31.1    Right Superficial Fibular Sensory   14 cm-Ankle NR NR NR  > 3 NR 14 cm-Ankle 12.5 NR   > 38 29.3    Left Sural Sensory   Calf-Lat Mall NR NR NR  > 5 NR Calf-Lat Mall 14 NR  > 38 30.9    Right Sural Sensory   Calf-Lat Mall NR NR NR  > 5 NR Calf-Lat Mall 14 NR  > 38 30.4    Right Ulnar Sensory   Wrist-Dig V 2.6 3.4 12  > 8 15 Wrist-Dig V 12.5 48  > 48 30.6      F Wave Studies     Min-F Max-F Dispersion Persistence Mean-F F-Norm L-R Mean-F L-R Mean-F Norm F/M Ratio F-M Lat (ms)   Right Ulnar (Abd Dig Min)  30.5  C   33.05 37.81 4.76 100.00 34.31 <36  <2.5 4.46 30.08       Electromyography     Side Muscle Ins Act Fibs/PSW Fasc HF Amp Dur Poly Recrt Int Pat   Right Deltoid Nml None Nml 0 Nml Nml 0 Nml Nml   Right Triceps Nml None Nml 0 Nml Nml 0 Nml Nml   Right EDC Nml None Nml 0 Nml Nml 0 Nml Nml   Right FDI Nml None Nml 0 Nml Nml 0 Nml Nml   Right Tib Anterior Incr 2+ Nml 0 2+ 2+ 0 ModRed Nml   Right Vastus Med Nml None Nml 0 Nml Nml 0 Nml Nml      Right    Iliopsoas    Nml    None    Nml    0   Nml   Nml    0    Nml    Nml   Right Glut Med Nml None Nml 0 0 0 0 Nml Nml   Right Gastrocnemius Nml 1+ Nml 0 1+ 1+ 0 Maryanne Nml   Right Fib Longus Nml 1+ Nml 0 1+ 1+ 0 Nml Decreased Activation         NCS Waveforms:    Motor                         Sensory

## 2024-01-29 NOTE — LETTER
2024       RE: Tres Ospina  [address]  [facility] [address]       Dear Colleague,    Thank you for referring your patient, Tres Ospina, to the [facility] EMG CLIN[facility] [place] at [facility]. Please see a copy of my visit note below.                            [facility]  Electrodiagnostic Laboratory                 Department of Neurology                                                                                                         Test Date:  2024    Patient: Tres Ospina : 1985 Physician: Manuel Valle MD   Sex: Male AGE: 38 year Ref Phys:    ID#: [id number]   Technician: Kristy Behling     History and Examination:  Patient is a 39 yo male with suspect subacute combined degeneration. EMG is ordered to evaluate severity of polyneuropathy.     Techniques:  Motor conduction studies were done with surface recording electrodes. Sensory conduction studies were performed with surface electrodes, unless indicated otherwise by (n), designating the use of subdermal recording electrodes. Temperature was monitored and recorded throughout the study. Upper extremities were maintained at a temperature of 32 degrees Centigrade or higher.  EMG was done with a concentric needle electrode.     Results:  Evaluation of the right/left Fibular (EDB) motor, the right/left Tibial (AHB) motor, the right Superficial Fibular sensory, the left sural sensory, and the right sural sensory nerves showed no response.  The right Ulnar (ADM) motor nerve showed decreased conduction velocity (Abv Elbow-Bel Elbow, 40 m/s).  All remaining nerves (as indicated in the following tables) were within normal limits.      All F Wave latencies were within normal limits.      Needle evaluation of the right Tibialis Anterior muscle showed increased insertional activity, moderately increased Fibs/PSW, moderately increased motor unit  amplitude, moderately increased motor unit duration, and moderately reduced recruitment. The right gastrocnemius showed mild increase in Fibs/PSW, slightly increase motor unit amplitude/duration, and mildly reduced recruitment. The right Fibularis Longus muscle showed increased Fibs/PSW, slightly increased motor unit amplitude, and slightly increased motor unit duration.  All remaining muscles (as indicated in the following table) showed no evidence of electrical instability.        Interpretation:  This is an abnormal examination. 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 Amp Diff Segment Distance Velocity Neg. Dur Neg Area Diff Temperature Comment   Site (ms) Norm (mV) Norm (%)  cm m/s Norm (ms) (%) ( C)    Right Dp Branch Fibular (TA) Motor   Fib Head 4.7  < 6.0 2.1 -      9.5  31.1    Pop Fossa 7.0  < 5.7 2.2 - 5 Pop Fossa-Fib Head 10 43 - 9.5 -2 31.1    Left Fibular (EDB) Motor   Ankle NR  < 6.0 NR  > 2.5  Ankle-EDB 8   NR  30.9    Right Fibular (EDB) Motor   Ankle NR  < 6.0 NR  > 2.5  Ankle-EDB 8   NR  30.6    Right Median (APB) Motor   Wrist 4.0  < 4.4 8.5  > 5.0  Wrist-APB 8   6.5  30.2    Elbow 9.3 - 7.6  > 5.0 -11 Elbow-Wrist 26 49  > 48 6.7 -7 30.3    Left Tibial (AHB) Motor   Ankle NR  < 6.5 NR  > 5.0  Ankle-AHB 8   NR  31.1    Right Tibial (AHB) Motor   Ankle NR  < 6.5 NR  > 5.0  Ankle-AHB 8   NR  30.8    Right Ulnar (ADM) Motor   Wrist 3.1  < 3.5 7.6  > 5.0  Wrist-ADM 8   6.3  30.3    Bel Elbow 7.9 - 6.6 - -13 Bel Elbow-Wrist 23 48  > 48 6.3 -8 30.3    Abv Elbow 10.4 - 6.3 - -5 Abv Elbow-Bel Elbow 10 40  > 48 6.7 2 30.3      Sensory Sites      Onset Lat Peak Lat Amp (O-P) Amp (P-P) Segment Distance Velocity Temperature  Comment   Site ms (ms)  V Norm ( V)  cm m/s Norm ( C)    Right Median Sensory   Wrist-Dig II 2.6 3.8 13  > 10 22 Wrist-Dig II 14 54  > 48 30.8    Right Radial Sensory   Forearm-Wrist 2.1 2.8 15  > 15 29 Forearm-Wrist 10 48 - 31.1    Right Superficial Fibular Sensory   14 cm-Ankle NR NR NR  > 3 NR 14 cm-Ankle 12.5 NR  > 38 29.3    Left Sural Sensory   Calf-Lat Mall NR NR NR  > 5 NR Calf-Lat Mall 14 NR  > 38 30.9    Right Sural Sensory   Calf-Lat Mall NR NR NR  > 5 NR Calf-Lat Mall 14 NR  > 38 30.4    Right Ulnar Sensory   Wrist-Dig V 2.6 3.4 12  > 8 15 Wrist-Dig V 12.5 48  > 48 30.6      F Wave Studies     Min-F Max-F Dispersion Persistence Mean-F F-Norm L-R Mean-F L-R Mean-F Norm F/M Ratio F-M Lat (ms)   Right Ulnar (Abd Dig Min)  30.5  C   33.05 37.81 4.76 100.00 34.31 <36  <2.5 4.46 30.08       Electromyography     Side Muscle Ins Act Fibs/PSW Fasc HF Amp Dur Poly Recrt Int Pat   Right Deltoid Nml None Nml 0 Nml Nml 0 Nml Nml   Right Triceps Nml None Nml 0 Nml Nml 0 Nml Nml   Right EDC Nml None Nml 0 Nml Nml 0 Nml Nml   Right FDI Nml None Nml 0 Nml Nml 0 Nml Nml   Right Tib Anterior Incr 2+ Nml 0 2+ 2+ 0 ModRed Nml   Right Vastus Med Nml None Nml 0 Nml Nml 0 Nml Nml      Right    Iliopsoas    Nml    None    Nml    0   Nml   Nml    0    Nml    Nml   Right Glut Med Nml None Nml 0 0 0 0 Nml Nml   Right Gastrocnemius Nml 1+ Nml 0 1+ 1+ 0 Maryanne Nml   Right Fib Longus Nml 1+ Nml 0 1+ 1+ 0 Nml Decreased Activation         NCS Waveforms:    Motor                         Sensory                       Again, thank you for allowing me to participate in the care of your patient.      Sincerely,    Manuel Valle MD

## 2024-02-15 ENCOUNTER — ANCILLARY PROCEDURE (OUTPATIENT)
Dept: MRI IMAGING | Facility: CLINIC | Age: 39
End: 2024-02-15
Attending: PSYCHIATRY & NEUROLOGY
Payer: COMMERCIAL

## 2024-02-15 DIAGNOSIS — G32.0 SUBACUTE COMBINED DEGENERATION (H): ICD-10-CM

## 2024-02-15 DIAGNOSIS — E53.8 SUBACUTE COMBINED DEGENERATION (H): ICD-10-CM

## 2024-02-15 PROCEDURE — 72156 MRI NECK SPINE W/O & W/DYE: CPT | Performed by: RADIOLOGY

## 2024-02-15 PROCEDURE — A9585 GADOBUTROL INJECTION: HCPCS | Performed by: RADIOLOGY

## 2024-02-15 PROCEDURE — 72157 MRI CHEST SPINE W/O & W/DYE: CPT | Performed by: RADIOLOGY

## 2024-02-15 RX ORDER — GADOBUTROL 604.72 MG/ML
10 INJECTION INTRAVENOUS ONCE
Status: DISCONTINUED | OUTPATIENT
Start: 2024-02-15 | End: 2024-02-15

## 2024-02-15 RX ORDER — GADOBUTROL 604.72 MG/ML
10 INJECTION INTRAVENOUS ONCE
Status: COMPLETED | OUTPATIENT
Start: 2024-02-15 | End: 2024-02-15

## 2024-02-15 RX ADMIN — GADOBUTROL 10 ML: 604.72 INJECTION INTRAVENOUS at 09:06

## 2024-02-15 NOTE — DISCHARGE INSTRUCTIONS
MRI Contrast Discharge Instructions    The IV contrast you received today will pass out of your body in your  urine. This will happen in the next 24 hours. You will not feel this process.  Your urine will not change color.    Drink at least 4 extra glasses of water or juice today (unless your doctor  has restricted your fluids). This reduces the stress on your kidneys.  You may take your regular medicines.    If you are on dialysis: It is best to have dialysis today.    If you have a reaction: Most reactions happen right away. If you have  any new symptoms after leaving the hospital (such as hives or swelling),  call your hospital at the correct number below. Or call your family doctor.  If you have breathing distress or wheezing, call 911.    Special instructions: ***    I have read and understand the above information.    Signature:______________________________________ Date:___________    Staff:__________________________________________ Date:___________     Time:__________    [facility] Departments:    ___[place]: [phone]  ___[facility]: [phone]  ___[place]: [phone] ___[facility]: [phone]  ___[facility]: [phone]  ___[facility]: [phone]  ___[phone]  ___[facility]: [phone]  ___[phone]

## 2024-03-26 ENCOUNTER — HOSPITAL ENCOUNTER (EMERGENCY)
Facility: CLINIC | Age: 39
Discharge: HOME | End: 2024-03-26
Attending: EMERGENCY MEDICINE | Admitting: EMERGENCY MEDICINE
Payer: COMMERCIAL

## 2024-03-26 ENCOUNTER — APPOINTMENT (OUTPATIENT)
Dept: CT IMAGING | Facility: CLINIC | Age: 39
End: 2024-03-26
Attending: EMERGENCY MEDICINE
Payer: COMMERCIAL

## 2024-03-26 VITALS
TEMPERATURE: 98 F | WEIGHT: 200 LBS | HEIGHT: 74 IN | HEART RATE: 63 BPM | RESPIRATION RATE: 22 BRPM | DIASTOLIC BLOOD PRESSURE: 94 MMHG | BODY MASS INDEX: 25.67 KG/M2 | OXYGEN SATURATION: 100 % | SYSTOLIC BLOOD PRESSURE: 140 MMHG

## 2024-03-26 DIAGNOSIS — F16.10: ICD-10-CM

## 2024-03-26 DIAGNOSIS — T41.0X5A ADVERSE EFFECT OF NITROUS OXIDE: ICD-10-CM

## 2024-03-26 DIAGNOSIS — R51.9 NONINTRACTABLE HEADACHE, UNSPECIFIED CHRONICITY PATTERN, UNSPECIFIED HEADACHE TYPE: ICD-10-CM

## 2024-03-26 LAB
CREAT BLD-MCNC: 1 MG/DL (ref 0.7–1.3)
EGFRCR SERPLBLD CKD-EPI 2021: >60 ML/MIN/1.73M2
HOLD SPECIMEN: NORMAL

## 2024-03-26 PROCEDURE — 99285 EMERGENCY DEPT VISIT HI MDM: CPT | Mod: 25 | Performed by: EMERGENCY MEDICINE

## 2024-03-26 PROCEDURE — 70498 CT ANGIOGRAPHY NECK: CPT | Mod: 26 | Performed by: STUDENT IN AN ORGANIZED HEALTH CARE EDUCATION/TRAINING PROGRAM

## 2024-03-26 PROCEDURE — 70450 CT HEAD/BRAIN W/O DYE: CPT

## 2024-03-26 PROCEDURE — 96361 HYDRATE IV INFUSION ADD-ON: CPT | Performed by: EMERGENCY MEDICINE

## 2024-03-26 PROCEDURE — 96360 HYDRATION IV INFUSION INIT: CPT | Mod: 59 | Performed by: EMERGENCY MEDICINE

## 2024-03-26 PROCEDURE — 70496 CT ANGIOGRAPHY HEAD: CPT

## 2024-03-26 PROCEDURE — 258N000003 HC RX IP 258 OP 636: Performed by: EMERGENCY MEDICINE

## 2024-03-26 PROCEDURE — 250N000011 HC RX IP 250 OP 636: Performed by: EMERGENCY MEDICINE

## 2024-03-26 PROCEDURE — 250N000013 HC RX MED GY IP 250 OP 250 PS 637: Performed by: EMERGENCY MEDICINE

## 2024-03-26 PROCEDURE — 99284 EMERGENCY DEPT VISIT MOD MDM: CPT | Performed by: EMERGENCY MEDICINE

## 2024-03-26 PROCEDURE — 82565 ASSAY OF CREATININE: CPT

## 2024-03-26 PROCEDURE — 70496 CT ANGIOGRAPHY HEAD: CPT | Mod: 26 | Performed by: STUDENT IN AN ORGANIZED HEALTH CARE EDUCATION/TRAINING PROGRAM

## 2024-03-26 RX ORDER — CYCLOBENZAPRINE HCL 5 MG
10 TABLET ORAL ONCE
Status: COMPLETED | OUTPATIENT
Start: 2024-03-26 | End: 2024-03-26

## 2024-03-26 RX ORDER — IOPAMIDOL 755 MG/ML
67 INJECTION, SOLUTION INTRAVASCULAR ONCE
Status: COMPLETED | OUTPATIENT
Start: 2024-03-26 | End: 2024-03-26

## 2024-03-26 RX ADMIN — IOPAMIDOL 67 ML: 755 INJECTION, SOLUTION INTRAVENOUS at 22:07

## 2024-03-26 RX ADMIN — CYCLOBENZAPRINE 10 MG: 5 TABLET, FILM COATED ORAL at 20:58

## 2024-03-26 RX ADMIN — SODIUM CHLORIDE 1000 ML: 9 INJECTION, SOLUTION INTRAVENOUS at 20:58

## 2024-03-26 ASSESSMENT — ACTIVITIES OF DAILY LIVING (ADL)
ADLS_ACUITY_SCORE: 35

## 2024-03-26 ASSESSMENT — COLUMBIA-SUICIDE SEVERITY RATING SCALE - C-SSRS
2. HAVE YOU ACTUALLY HAD ANY THOUGHTS OF KILLING YOURSELF IN THE PAST MONTH?: NO
1. IN THE PAST MONTH, HAVE YOU WISHED YOU WERE DEAD OR WISHED YOU COULD GO TO SLEEP AND NOT WAKE UP?: NO
6. HAVE YOU EVER DONE ANYTHING, STARTED TO DO ANYTHING, OR PREPARED TO DO ANYTHING TO END YOUR LIFE?: NO

## 2024-03-27 NOTE — DISCHARGE INSTRUCTIONS
TODAY'S VISIT:  You were seen today for headache  -   - If you had any labs or imaging/radiology tests performed today, you should also discuss these tests with your usual provider.     FOLLOW-UP:  Please make an appointment to follow up with:  - Your Primary Care Provider. If you do not have a PCP, please call the Primary Care Center (phone: [phone] for an appointment    - Have your provider review the results from today's visit with you again to make sure no further follow-up or additional testing is needed based on those results.     OTHER INSTRUCTIONS:  -Stop abusing ketamine, whippets, and any other illicit drugs    RETURN TO THE EMERGENCY DEPARTMENT  Return to the Emergency Department at any time for any new or worsening symptoms or any concerns.

## 2024-03-27 NOTE — ED PROVIDER NOTES
"  History     Chief Complaint   Patient presents with    Drug Reaction     HPI  Tres Ospina is a 38 year old male with a past medical history of polysubstance abuse (nitrous oxide, ketamine), latent tuberculosis, dystonia, depression, vitamin D deficiency, anemia who presents to the emergency department with a chief complaint of headache.  The patient states that several days ago, he relapsed and began using whippets and ketamine again.  This evening, while he was using a weapon it, he had a sudden onset of a severe headache.  It was located primarily to the posterior left side of his head and down the left side of his neck.  It has since largely resolved, but the patient does note some pain to the left side of his neck that remains.  He does have a family history of cerebral aneurysm in his mother.  No recent trauma.  He is not on any blood thinners.  No associated neurological symptoms such as numbness, weakness, tingling, difficulty with speech or ambulation, or vision changes.  The patient states he did have a strange sensation of something \"moving\" around in his neck/posterior head, but he thinks that this was related to hallucinations due to his drug use.  The patient did not drive himself here today.    I have reviewed the Medications, Allergies, Past Medical and Surgical History, and Social History in the [employer] system.    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     Current Outpatient Medications   Medication    augmented betamethasone dipropionate (DIPROLENE-AF) 0.05 % external cream    calcipotriene (DOVONOX) 0.005 % external " ointment    carbidopa-levodopa (SINEMET)  MG tablet    clobetasol (TEMOVATE) 0.05 % external cream    cyanocobalamin (VITAMIN B-12) 1000 MCG tablet    etanercept (ENBREL SURECLICK) 50 MG/ML autoinjector    fish oil-omega-3 fatty acids 1000 MG capsule    hydrocortisone 2.5 % ointment    hydrOXYzine (ATARAX) 25 MG tablet    ketoconazole (NIZORAL) 2 % external cream    magnesium 250 MG tablet    medical cannabis (Patient's own supply)    methocarbamol (ROBAXIN) 750 MG tablet    metroNIDAZOLE (METROCREAM) 0.75 % external cream    mometasone (ELOCON) 0.1 % external cream    multivitamin w/minerals (THERA-VIT-M) tablet    NONFORMULARY    NONFORMULARY     No Known Allergies  Past medical history, past surgical history, medications, and allergies were reviewed with the patient. Additional pertinent items: None    Social History     Socioeconomic History    Marital status: Single     Spouse name: Not on file    Number of children: Not on file    Years of education: Not on file    Highest education level: Not on file   Occupational History    Occupation: [employer]     Comment: [employer]   Tobacco Use    Smoking status: Some Days     Packs/day: .25     Types: Cigarettes    Smokeless tobacco: Never    Tobacco comments:     Quit in 2007. Smoked occasionally. Smoked for 6 months.   Substance and Sexual Activity    Alcohol use: Yes     Comment: Drinks occaionally.     Drug use: Yes     Types: Marijuana     Comment: medical cannabis    Sexual activity: Not on file   Other Topics Concern    Parent/sibling w/ CABG, MI or angioplasty before 65F 55M? Not Asked   Social History Narrative    Quit smoking in 2007. He only smoked for 6 months occasionally. Drinks alcohol occaionally. Lives by himself in [facility] in [place].         Going      Social Determinants of Health     Financial Resource Strain: Not on file   Food Insecurity: Not on file   Transportation Needs: Not on  "file   Physical Activity: Not on file   Stress: Not on file   Social Connections: Not on file   Interpersonal Safety: Not on file   Housing Stability: Not on file     Social history was reviewed with the patient. Additional pertinent items: None    Review of Systems  A medically appropriate review of systems was performed with pertinent positives and negatives noted in the HPI, and all other systems negative.    Physical Exam   BP: (!) 161/75  Pulse: 76  Temp: 98  F (36.7  C)  Resp: 22  Height: 188 cm (6' 2\")  Weight: 90.7 kg (200 lb)  SpO2: 99 %      General: Well nourished, well developed, NAD  HEENT: EOMI, anicteric. NCAT, MMM  Neck: no jugular venous distension, supple, nl ROM, there is left trapezius spasm, no midline tenderness or step-off  Cardiac: Regular rate and rhythm. No murmurs, rubs, or gallops. Normal S1, S2.  Intact peripheral pulses  Pulm: CTAB, no stridor, wheezes, rales, rhonchi  Skin: Warm and dry to the touch.  No rash  Extremities: No LE edema, no cyanosis, w/w/p  Neuro: Alert and oriented x 4, no facial droop, CN II-XII intact, moving all 4 extremities, sensation intact throughout, steady gait, no nystagmus, coordination normal as tested. PERRL, EOMI.  Speech is clear without aphasia or dysarthria.      ED Course        Procedures                        Labs Ordered and Resulted from Time of ED Arrival to Time of ED Departure   ISTAT CREATININE POCT - Normal       Result Value    Creatinine POCT 1.0      GFR, ESTIMATED POCT >60     ISTAT CREATININE POCT            Results for orders placed or performed during the hospital encounter of 03/26/24 (from the past 24 hour(s))   [facility] Draw    Narrative    The following orders were created for panel order [facility] Draw.  Procedure                               Abnormality         Status                     ---------                               -----------         ------                     Extra Red Top Tube[[id number]]                               " Final result               Extra Green Top (Lithium...[[id number]]                      Final result               Extra Purple Top Tube[[id number]]                            Final result                 Please view results for these tests on the individual orders.   Extra Red Top Tube   Result Value Ref Range    Hold Specimen JIC    Extra Green Top (Lithium Heparin) Tube   Result Value Ref Range    Hold Specimen JIC    Extra Purple Top Tube   Result Value Ref Range    Hold Specimen JIC    Creatinine POCT   Result Value Ref Range    Creatinine POCT 1.0 0.7 - 1.3 mg/dL    GFR, ESTIMATED POCT >60 >60 mL/min/1.73m2   CT Head w/o Contrast    Narrative    EXAM: CT HEAD W/O CONTRAST, CTA HEAD NECK W CONTRAST  LOCATION: [facility]  DATE: 3/26/2024  EXAM: CT HEAD W/O CONTRAST, CTA HEAD NECK W CONTRAST  LOCATION: [facility]  DATE/TIME: 3/26/2024 10:21 PM CDT    INDICATION: Headache; Acute HA (< 3 months), no complicating features  COMPARISON: MRI brain 04/22/2022  CONTRAST: Isovue 370 67 mL   TECHNIQUE: Head and neck CT angiogram with IV contrast. Noncontrast head CT followed by axial helical CT images of the head and neck vessels obtained during the arterial phase of intravenous contrast administration. Axial 2D reconstructed images and   multiplanar 3D MIP reconstructed images of the head and neck vessels were performed by the technologist. Dose reduction techniques were used. All stenosis measurements made according to NASCET criteria unless otherwise specified.    FINDINGS:   NONCONTRAST HEAD CT:   INTRACRANIAL CONTENTS: No intracranial hemorrhage, extraaxial collection, or mass effect.  No CT evidence of acute infarct. Normal parenchymal attenuation. Normal ventricles and sulci.     VISUALIZED ORBITS/SINUSES/MASTOIDS: No intraorbital abnormality. No significant paranasal sinus mucosal disease. No middle ear or mastoid  effusion.    BONES/SOFT TISSUES: No acute abnormality.    HEAD CTA:  ANTERIOR CIRCULATION: No stenosis/occlusion, aneurysm, or high flow vascular malformation. Standard [place] anatomy.    POSTERIOR CIRCULATION: No stenosis/occlusion, aneurysm, or high flow vascular malformation. Dominant left and smaller right vertebral artery contribute to a normal basilar artery.     DURAL VENOUS SINUSES: Expected enhancement of the major dural venous sinuses.    NECK CTA:  RIGHT CAROTID: No measurable stenosis or dissection.    LEFT CAROTID: No measurable stenosis or dissection.    VERTEBRAL ARTERIES: No focal stenosis or dissection. Dominant left and smaller right vertebral arteries.    AORTIC ARCH: Classic aortic arch anatomy with no significant stenosis at the origin of the great vessels.    NONVASCULAR STRUCTURES: Unremarkable.      Impression    IMPRESSION:   HEAD CT:  1.  No acute intracranial process.    HEAD CTA:   1.  Normal CTA [place].    NECK CTA:  1.  Normal neck CTA.   CTA Head Neck with Contrast    Narrative    EXAM: CT HEAD W/O CONTRAST, CTA HEAD NECK W CONTRAST  LOCATION: [facility]  DATE: 3/26/2024  EXAM: CT HEAD W/O CONTRAST, CTA HEAD NECK W CONTRAST  LOCATION: [facility]  DATE/TIME: 3/26/2024 10:21 PM CDT    INDICATION: Headache; Acute HA (< 3 months), no complicating features  COMPARISON: MRI brain 04/22/2022  CONTRAST: Isovue 370 67 mL   TECHNIQUE: Head and neck CT angiogram with IV contrast. Noncontrast head CT followed by axial helical CT images of the head and neck vessels obtained during the arterial phase of intravenous contrast administration. Axial 2D reconstructed images and   multiplanar 3D MIP reconstructed images of the head and neck vessels were performed by the technologist. Dose reduction techniques were used. All stenosis measurements made according to NASCET criteria unless otherwise  specified.    FINDINGS:   NONCONTRAST HEAD CT:   INTRACRANIAL CONTENTS: No intracranial hemorrhage, extraaxial collection, or mass effect.  No CT evidence of acute infarct. Normal parenchymal attenuation. Normal ventricles and sulci.     VISUALIZED ORBITS/SINUSES/MASTOIDS: No intraorbital abnormality. No significant paranasal sinus mucosal disease. No middle ear or mastoid effusion.    BONES/SOFT TISSUES: No acute abnormality.    HEAD CTA:  ANTERIOR CIRCULATION: No stenosis/occlusion, aneurysm, or high flow vascular malformation. Standard [place] anatomy.    POSTERIOR CIRCULATION: No stenosis/occlusion, aneurysm, or high flow vascular malformation. Dominant left and smaller right vertebral artery contribute to a normal basilar artery.     DURAL VENOUS SINUSES: Expected enhancement of the major dural venous sinuses.    NECK CTA:  RIGHT CAROTID: No measurable stenosis or dissection.    LEFT CAROTID: No measurable stenosis or dissection.    VERTEBRAL ARTERIES: No focal stenosis or dissection. Dominant left and smaller right vertebral arteries.    AORTIC ARCH: Classic aortic arch anatomy with no significant stenosis at the origin of the great vessels.    NONVASCULAR STRUCTURES: Unremarkable.      Impression    IMPRESSION:   HEAD CT:  1.  No acute intracranial process.    HEAD CTA:   1.  Normal CTA [place].    NECK CTA:  1.  Normal neck CTA.       Labs, vital signs, and imaging studies were reviewed by me.    Medications   sodium chloride 0.9% BOLUS 1,000 mL (1,000 mLs Intravenous $New Bag 3/26/24 2058)   cyclobenzaprine (FLEXERIL) tablet 10 mg (10 mg Oral $Given 3/26/24 2058)   sodium chloride (PF) 0.9% PF flush 90 mL (90 mLs Intravenous $Given 3/26/24 2206)   iopamidol (ISOVUE-370) solution 67 mL (67 mLs Intravenous $Given 3/26/24 2207)       Assessments & Plan (with Medical Decision Making)   Tres Ospina is a 38 year old male who presents to the emergency department with headache that  began after nitrous oxide use.  Likely secondary to patient's drug abuse and symptoms are significantly improving.  However, given patient's family history of cerebral aneurysm and sudden onset/severe nature of his headache when it began, will obtain CT/CTA to help rule out aneurysmal cause of patient's pain.  Patient's significant improvement in pain suggests that this is a less likely etiology.  Differential diagnosis would also include migraine, tension headache, cluster headache, neck muscle spasm.  Patient does have some trapezius spasm noted on the left on exam.  Flexeril was ordered for symptomatic relief in the emergency department.    Point-of-care creatinine is within normal limits.    Head CT shows no acute intracranial process.  Head CTA shows normal [place], neck CTA is normal as well.    After Flexeril was given, patient states his symptoms have resolved.  He declines a prescription for any additional medication for symptom control at home.    Critical care was not performed.     Medical Decision Making  The patient's presentation was of high complexity (an acute health issue posing potential threat to life or bodily function).    The patient's evaluation involved:  ordering and/or review of 3+ test(s) in this encounter (see separate area of note for details)  independent interpretation of testing performed by another health professional (CT)    The patient's management necessitated moderate risk (prescription drug management including medications given in the ED) and moderate risk (limitations due to social determinants of health (polysubstance abuse)).    CT images were personally reviewed by me, I agree with the radiology reads.    I have reviewed the nursing notes.    I have reviewed the findings, diagnosis, plan and need for follow up with the patient.    Patient to be discharged home. Advised to follow up with PCP within 1 week.  The patient does feel he has adequate outpatient supports  to help him discontinue his drug usage going forward.  Patient was instructed to return to ER immediately with any new/worsening symptoms, particularly severe headache or new neurologic symptoms such as numbness, weakness, tingling, difficulty with speech or ambulation, or vision changes. Plan of care discussed with patient who expresses understanding and agrees with plan of care.    New Prescriptions    No medications on file       Final diagnoses:   Nonintractable headache, unspecified chronicity pattern, unspecified headache type   Adverse effect of nitrous oxide   Ketamine use disorder, mild, abuse (H)       NIKKO CULLEN MD  3/26/2024   [facility] EMERGENCY DEPARTMENT       Nikko Cullen MD  03/26/24 2009

## 2024-03-27 NOTE — ED TRIAGE NOTES
"Chief Complaint: Patient presents to the ED for evaluation of new headache beginning this evening while using nitrous oxide ([place]) and Ketamine.     Onset of Symptoms: Today, about an hour ago    Home Remedies: n/a    Triage Vital Signs: BP (!) 161/75   Pulse 76   Temp 98  F (36.7  C) (Oral)   Resp 22   Ht 1.88 m (6' 2\")   Wt 90.7 kg (200 lb)   SpO2 99%   BMI 25.68 kg/m      Andrés Norwood RN  3/26/2024     Triage Assessment (Adult)       Row Name 03/26/24 2005          Triage Assessment    Airway WDL WDL        Respiratory WDL    Respiratory WDL WDL        Skin Circulation/Temperature WDL    Skin Circulation/Temperature WDL WDL        Cardiac WDL    Cardiac WDL WDL        Peripheral/Neurovascular WDL    Peripheral Neurovascular WDL WDL        Cognitive/Neuro/Behavioral WDL    Cognitive/Neuro/Behavioral WDL WDL                     "

## 2024-05-04 ENCOUNTER — HOSPITAL ENCOUNTER (EMERGENCY)
Facility: CLINIC | Age: 39
Discharge: HOME | End: 2024-05-04
Attending: EMERGENCY MEDICINE | Admitting: EMERGENCY MEDICINE
Payer: COMMERCIAL

## 2024-05-04 ENCOUNTER — APPOINTMENT (OUTPATIENT)
Dept: GENERAL RADIOLOGY | Facility: CLINIC | Age: 39
End: 2024-05-04
Attending: EMERGENCY MEDICINE
Payer: COMMERCIAL

## 2024-05-04 VITALS
HEIGHT: 74 IN | SYSTOLIC BLOOD PRESSURE: 132 MMHG | DIASTOLIC BLOOD PRESSURE: 80 MMHG | WEIGHT: 180 LBS | OXYGEN SATURATION: 99 % | RESPIRATION RATE: 14 BRPM | TEMPERATURE: 98.4 F | HEART RATE: 70 BPM | BODY MASS INDEX: 23.1 KG/M2

## 2024-05-04 DIAGNOSIS — R00.2 PALPITATIONS: ICD-10-CM

## 2024-05-04 LAB
ALBUMIN SERPL BCG-MCNC: 3.7 G/DL (ref 3.5–5.2)
ALP SERPL-CCNC: 49 U/L (ref 40–150)
ALT SERPL W P-5'-P-CCNC: 9 U/L (ref 0–70)
ANION GAP SERPL CALCULATED.3IONS-SCNC: 11 MMOL/L (ref 7–15)
AST SERPL W P-5'-P-CCNC: 21 U/L (ref 0–45)
BASOPHILS # BLD AUTO: 0 10E3/UL (ref 0–0.2)
BASOPHILS NFR BLD AUTO: 0 %
BILIRUB DIRECT SERPL-MCNC: <0.2 MG/DL (ref 0–0.3)
BILIRUB SERPL-MCNC: 0.6 MG/DL
BUN SERPL-MCNC: 20.7 MG/DL (ref 6–20)
CA-I BLD-MCNC: 4.4 MG/DL (ref 4.4–5.2)
CALCIUM SERPL-MCNC: 8.3 MG/DL (ref 8.6–10)
CHLORIDE SERPL-SCNC: 108 MMOL/L (ref 98–107)
CREAT SERPL-MCNC: 0.76 MG/DL (ref 0.67–1.17)
DEPRECATED HCO3 PLAS-SCNC: 20 MMOL/L (ref 22–29)
EGFRCR SERPLBLD CKD-EPI 2021: >90 ML/MIN/1.73M2
EOSINOPHIL # BLD AUTO: 0.3 10E3/UL (ref 0–0.7)
EOSINOPHIL NFR BLD AUTO: 5 %
ERYTHROCYTE [DISTWIDTH] IN BLOOD BY AUTOMATED COUNT: 15.2 % (ref 10–15)
FLUAV RNA SPEC QL NAA+PROBE: NEGATIVE
FLUBV RNA RESP QL NAA+PROBE: NEGATIVE
GLUCOSE SERPL-MCNC: 138 MG/DL (ref 70–99)
HCT VFR BLD AUTO: 35.3 % (ref 40–53)
HGB BLD-MCNC: 12.2 G/DL (ref 13.3–17.7)
HOLD SPECIMEN: NORMAL
IMM GRANULOCYTES # BLD: 0 10E3/UL
IMM GRANULOCYTES NFR BLD: 1 %
LYMPHOCYTES # BLD AUTO: 2.3 10E3/UL (ref 0.8–5.3)
LYMPHOCYTES NFR BLD AUTO: 35 %
MCH RBC QN AUTO: 32.4 PG (ref 26.5–33)
MCHC RBC AUTO-ENTMCNC: 34.6 G/DL (ref 31.5–36.5)
MCV RBC AUTO: 94 FL (ref 78–100)
MONOCYTES # BLD AUTO: 0.6 10E3/UL (ref 0–1.3)
MONOCYTES NFR BLD AUTO: 8 %
NEUTROPHILS # BLD AUTO: 3.3 10E3/UL (ref 1.6–8.3)
NEUTROPHILS NFR BLD AUTO: 51 %
NRBC # BLD AUTO: 0 10E3/UL
NRBC BLD AUTO-RTO: 0 /100
PLATELET # BLD AUTO: 194 10E3/UL (ref 150–450)
POTASSIUM SERPL-SCNC: 3.8 MMOL/L (ref 3.4–5.3)
PROT SERPL-MCNC: 6 G/DL (ref 6.4–8.3)
RBC # BLD AUTO: 3.76 10E6/UL (ref 4.4–5.9)
RSV RNA SPEC NAA+PROBE: NEGATIVE
SARS-COV-2 RNA RESP QL NAA+PROBE: NEGATIVE
SODIUM SERPL-SCNC: 139 MMOL/L (ref 135–145)
TROPONIN T SERPL HS-MCNC: 21 NG/L
TSH SERPL DL<=0.005 MIU/L-ACNC: 2.31 UIU/ML (ref 0.3–4.2)
WBC # BLD AUTO: 6.5 10E3/UL (ref 4–11)

## 2024-05-04 PROCEDURE — 36415 COLL VENOUS BLD VENIPUNCTURE: CPT | Performed by: EMERGENCY MEDICINE

## 2024-05-04 PROCEDURE — 80053 COMPREHEN METABOLIC PANEL: CPT | Performed by: EMERGENCY MEDICINE

## 2024-05-04 PROCEDURE — 71046 X-RAY EXAM CHEST 2 VIEWS: CPT | Mod: 26 | Performed by: RADIOLOGY

## 2024-05-04 PROCEDURE — 85025 COMPLETE CBC W/AUTO DIFF WBC: CPT | Performed by: EMERGENCY MEDICINE

## 2024-05-04 PROCEDURE — 84443 ASSAY THYROID STIM HORMONE: CPT | Performed by: EMERGENCY MEDICINE

## 2024-05-04 PROCEDURE — 71046 X-RAY EXAM CHEST 2 VIEWS: CPT

## 2024-05-04 PROCEDURE — 82330 ASSAY OF CALCIUM: CPT | Performed by: EMERGENCY MEDICINE

## 2024-05-04 PROCEDURE — 93010 ELECTROCARDIOGRAM REPORT: CPT | Performed by: EMERGENCY MEDICINE

## 2024-05-04 PROCEDURE — 82248 BILIRUBIN DIRECT: CPT | Performed by: EMERGENCY MEDICINE

## 2024-05-04 PROCEDURE — 84484 ASSAY OF TROPONIN QUANT: CPT | Performed by: EMERGENCY MEDICINE

## 2024-05-04 PROCEDURE — 93005 ELECTROCARDIOGRAM TRACING: CPT | Performed by: EMERGENCY MEDICINE

## 2024-05-04 PROCEDURE — 99284 EMERGENCY DEPT VISIT MOD MDM: CPT | Performed by: EMERGENCY MEDICINE

## 2024-05-04 PROCEDURE — 99285 EMERGENCY DEPT VISIT HI MDM: CPT | Mod: 25 | Performed by: EMERGENCY MEDICINE

## 2024-05-04 PROCEDURE — 80048 BASIC METABOLIC PNL TOTAL CA: CPT | Performed by: EMERGENCY MEDICINE

## 2024-05-04 PROCEDURE — 87637 SARSCOV2&INF A&B&RSV AMP PRB: CPT | Performed by: EMERGENCY MEDICINE

## 2024-05-04 ASSESSMENT — ACTIVITIES OF DAILY LIVING (ADL)
ADLS_ACUITY_SCORE: 35

## 2024-05-04 NOTE — DISCHARGE INSTRUCTIONS
Minimize caffeine use.  Avoid stimulant use including cold medications.  Avoid drug use-seek outpatient chemical dependency treatment.    Follow-up with your primary care clinic next week.    Return to the emergency department if worse, recurrent symptoms, or other concerns.

## 2024-05-04 NOTE — ED TRIAGE NOTES
Triage Assessment & Note:    There were no vitals taken for this visit.      [facility] with reports of chest pain/ palpitations s/p nitrous oxide at 0300 and nasal ketamine use.No reports of fever, cough, SOB, or travel.     Home Treatments/Remedies: Home medications, asprin, nitroglycerin, droperidol given     Febrile / Afebrile: afebrile    Duration of C/o: < 3 hrs  20g R hand  300 ml of NaCL  128 bg  80 pulse  120-130/ 80 for BP    Denise Peters RN  May 4, 2024

## 2024-05-04 NOTE — ED NOTES
Bed: ED10  Expected date: 5/4/24  Expected time: 11:14 AM  Means of arrival: Ambulance  Comments:  H 411  38M  Substernal chest pain after ketamine and nitrus use  Yellow

## 2024-05-04 NOTE — ED PROVIDER NOTES
ED Provider Note  [facility]      History     Chief Complaint   Patient presents with    Chest Pain    Palpitations     HPI  Tres Ospina is a 38 year old male with history of nitrous oxide and ketamine abuse, latent TB, depression, dystonia, anemia who presents to the emergency department for evaluation of palpitations.  Patient states that he awoke this morning with a sense that his heart was beating fast and irregularly.  He denies any associated chest pain or dyspnea.  He reports occasional, nonproductive cough for the past week.  He denies any fever.  No abdominal pain.  No nausea or vomiting.  He states he did snort both ketamine and nitrous oxide last night.    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    History of COVID-19 06/11/2021    Psoriatic arthritis (H) 06/11/2021     Past Surgical History:   Procedure Laterality Date    KNEE SURGERY Right 04/22/2021    meniscus removal and repair     augmented betamethasone dipropionate (DIPROLENE-AF) 0.05 % external cream  calcipotriene (DOVONOX) 0.005 % external ointment  carbidopa-levodopa (SINEMET)  MG tablet  clobetasol (TEMOVATE) 0.05 % external cream  cyanocobalamin (VITAMIN B-12) 1000 MCG tablet  etanercept (ENBREL SURECLICK) 50 MG/ML autoinjector  fish oil-omega-3 fatty acids 1000 MG capsule  hydrocortisone 2.5 % ointment  hydrOXYzine (ATARAX) 25 MG tablet  ketoconazole (NIZORAL) 2 % external cream  magnesium 250 MG tablet  medical cannabis (Patient's own supply)  methocarbamol (ROBAXIN) 750 MG tablet  metroNIDAZOLE (METROCREAM) 0.75 % external cream  mometasone (ELOCON) 0.1 % external cream  multivitamin w/minerals (THERA-VIT-M) tablet  NONFORMULARY  NONFORMULARY      No Known Allergies  Family History  Family History   Problem Relation Age of Onset    Depression Mother      "Hypertension Mother     Depression Father     Alcoholism Father     Depression Sister     Attention Deficit Disorder Sister         ADHD, Anxiety.    Other - See Comments Maternal Cousin      Social History   Social History     Tobacco Use    Smoking status: Some Days     Current packs/day: 0.25     Types: Cigarettes    Smokeless tobacco: Never    Tobacco comments:     Quit in 2007. Smoked occasionally. Smoked for 6 months.   Substance Use Topics    Alcohol use: Yes     Comment: Drinks occaionally.     Drug use: Yes     Types: Marijuana     Comment: medical cannabis         A medically appropriate review of systems was performed with pertinent positives and negatives noted in the HPI, and all other systems negative.    Physical Exam   BP: 134/79  Pulse: 74  Temp: 98.4  F (36.9  C)  Resp: 14  Height: 188 cm (6' 2\")  Weight: 81.6 kg (180 lb)  SpO2: 98 %  Physical Exam  Vitals and nursing note reviewed.   Constitutional:       General: He is not in acute distress.     Appearance: He is well-developed. He is not diaphoretic.   HENT:      Head: Atraumatic.   Eyes:      General: No scleral icterus.     Pupils: Pupils are equal, round, and reactive to light.   Cardiovascular:      Heart sounds: Normal heart sounds.   Pulmonary:      Effort: No respiratory distress.      Breath sounds: Normal breath sounds.   Abdominal:      General: Bowel sounds are normal.      Palpations: Abdomen is soft.      Tenderness: There is no abdominal tenderness.   Musculoskeletal:         General: No tenderness.   Skin:     General: Skin is warm.      Findings: No rash.          Neurological:      Mental Status: He is alert.           ED Course, Procedures, & Data      Procedures            EKG Interpretation:      Interpreted by HANNAH GUIDO MD, MD  Time reviewed: 1138  Symptoms at time of EKG: None   Rhythm: normal sinus   Rate: 72  Axis: Normal  Ectopy: none  Conduction: normal  ST Segments/ T Waves: T wave flattening   Q Waves: " none  Comparison to prior: New flattened T waves, otherwise unchanged    Clinical Impression: non-specific EKG     Results for orders placed or performed during the hospital encounter of 05/04/24   XR Chest 2 Views     Status: None    Narrative    Exam: XR CHEST 2 VIEWS, 5/4/2024 11:59 AM    Indication: chest pain    Comparison: Chest radiograph 8/19/2023    Findings:   PA and lateral upright views of the chest were obtained. Normal  cardiomediastinal silhouette. Distinct pulmonary vasculature. Clear  costophrenic angles. No pneumothorax. No focal pulmonary opacities. No  acute osseous abnormality. Visualized upper abdomen is unremarkable.      Impression    Impression: Clear chest    I have personally reviewed the examination and initial interpretation  and I agree with the findings.    JORDAN LOBO MD         SYSTEM ID:  [id number]   Basic metabolic panel     Status: Abnormal   Result Value Ref Range    Sodium 139 135 - 145 mmol/L    Potassium 3.8 3.4 - 5.3 mmol/L    Chloride 108 (H) 98 - 107 mmol/L    Carbon Dioxide (CO2) 20 (L) 22 - 29 mmol/L    Anion Gap 11 7 - 15 mmol/L    Urea Nitrogen 20.7 (H) 6.0 - 20.0 mg/dL    Creatinine 0.76 0.67 - 1.17 mg/dL    GFR Estimate >90 >60 mL/min/1.73m2    Calcium 8.3 (L) 8.6 - 10.0 mg/dL    Glucose 138 (H) 70 - 99 mg/dL   Troponin T, High Sensitivity     Status: Normal   Result Value Ref Range    Troponin T, High Sensitivity 21 <=22 ng/L   TSH with free T4 reflex     Status: Normal   Result Value Ref Range    TSH 2.31 0.30 - 4.20 uIU/mL   CBC with platelets and differential     Status: Abnormal   Result Value Ref Range    WBC Count 6.5 4.0 - 11.0 10e3/uL    RBC Count 3.76 (L) 4.40 - 5.90 10e6/uL    Hemoglobin 12.2 (L) 13.3 - 17.7 g/dL    Hematocrit 35.3 (L) 40.0 - 53.0 %    MCV 94 78 - 100 fL    MCH 32.4 26.5 - 33.0 pg    MCHC 34.6 31.5 - 36.5 g/dL    RDW 15.2 (H) 10.0 - 15.0 %    Platelet Count 194 150 - 450 10e3/uL    % Neutrophils 51 %    % Lymphocytes 35 %    % Monocytes  8 %    % Eosinophils 5 %    % Basophils 0 %    % Immature Granulocytes 1 %    NRBCs per 100 WBC 0 <1 /100    Absolute Neutrophils 3.3 1.6 - 8.3 10e3/uL    Absolute Lymphocytes 2.3 0.8 - 5.3 10e3/uL    Absolute Monocytes 0.6 0.0 - 1.3 10e3/uL    Absolute Eosinophils 0.3 0.0 - 0.7 10e3/uL    Absolute Basophils 0.0 0.0 - 0.2 10e3/uL    Absolute Immature Granulocytes 0.0 <=0.4 10e3/uL    Absolute NRBCs 0.0 10e3/uL   Symptomatic Influenza A/B, RSV, & SARS-CoV2 PCR (COVID-19) Nasopharyngeal     Status: Normal    Specimen: Nasopharyngeal; Swab   Result Value Ref Range    Influenza A PCR Negative Negative    Influenza B PCR Negative Negative    RSV PCR Negative Negative    SARS CoV2 PCR Negative Negative    Narrative    Testing was performed using the Xpert Xpress CoV2/Flu/RSV Assay on the Functional Neuromodulation GeneXpert Instrument. This test should be ordered for the detection of SARS-CoV-2, influenza, and RSV viruses in individuals who meet clinical and/or epidemiological criteria. Test performance is unknown in asymptomatic patients. This test is for in vitro diagnostic use under the FDA EUA for laboratories certified under CLIA to perform high or moderate complexity testing. This test has not been FDA cleared or approved. A negative result does not rule out the presence of PCR inhibitors in the specimen or target RNA in concentration below the limit of detection for the assay. If only one viral target is positive but coinfection with multiple targets is suspected, the sample should be re-tested with another FDA cleared, approved, or authorized test, if coinfection would change clinical management. This test was validated by the [facility]. These laboratories are certified under the Clinical Laboratory Improvement Amendments of 1988 (CLIA-88) as qualified to perform high complexity laboratory testing.   [place] Draw     Status: None    Narrative    The following orders were created for panel order [place]  Draw.  Procedure                               Abnormality         Status                     ---------                               -----------         ------                     Extra Blue Top Tube[[id number]]                              Final result               Extra Red Top Tube[[id number]]                               Final result               Extra Purple Top Tube[[id number]]                            Final result                 Please view results for these tests on the individual orders.   Extra Blue Top Tube     Status: None   Result Value Ref Range    Hold Specimen JIC    Extra Red Top Tube     Status: None   Result Value Ref Range    Hold Specimen JIC    Extra Purple Top Tube     Status: None   Result Value Ref Range    Hold Specimen JIC    Hepatic panel     Status: Abnormal   Result Value Ref Range    Protein Total 6.0 (L) 6.4 - 8.3 g/dL    Albumin 3.7 3.5 - 5.2 g/dL    Bilirubin Total 0.6 <=1.2 mg/dL    Alkaline Phosphatase 49 40 - 150 U/L    AST 21 0 - 45 U/L    ALT 9 0 - 70 U/L    Bilirubin Direct <0.20 0.00 - 0.30 mg/dL   Ionized Calcium     Status: Normal   Result Value Ref Range    Calcium Ionized Whole Blood 4.4 4.4 - 5.2 mg/dL   CBC with platelets differential     Status: Abnormal    Narrative    The following orders were created for panel order CBC with platelets differential.  Procedure                               Abnormality         Status                     ---------                               -----------         ------                     CBC with platelets and d...[[id number]]  Abnormal            Final result                 Please view results for these tests on the individual orders.     Medications - No data to display  Labs Ordered and Resulted from Time of ED Arrival to Time of ED Departure   BASIC METABOLIC PANEL - Abnormal       Result Value    Sodium 139      Potassium 3.8      Chloride 108 (*)     Carbon Dioxide (CO2) 20 (*)     Anion Gap 11      Urea Nitrogen 20.7 (*)      Creatinine 0.76      GFR Estimate >90      Calcium 8.3 (*)     Glucose 138 (*)    CBC WITH PLATELETS AND DIFFERENTIAL - Abnormal    WBC Count 6.5      RBC Count 3.76 (*)     Hemoglobin 12.2 (*)     Hematocrit 35.3 (*)     MCV 94      MCH 32.4      MCHC 34.6      RDW 15.2 (*)     Platelet Count 194      % Neutrophils 51      % Lymphocytes 35      % Monocytes 8      % Eosinophils 5      % Basophils 0      % Immature Granulocytes 1      NRBCs per 100 WBC 0      Absolute Neutrophils 3.3      Absolute Lymphocytes 2.3      Absolute Monocytes 0.6      Absolute Eosinophils 0.3      Absolute Basophils 0.0      Absolute Immature Granulocytes 0.0      Absolute NRBCs 0.0     HEPATIC FUNCTION PANEL - Abnormal    Protein Total 6.0 (*)     Albumin 3.7      Bilirubin Total 0.6      Alkaline Phosphatase 49      AST 21      ALT 9      Bilirubin Direct <0.20     TROPONIN T, HIGH SENSITIVITY - Normal    Troponin T, High Sensitivity 21     TSH WITH FREE T4 REFLEX - Normal    TSH 2.31     INFLUENZA A/B, RSV, & SARS-COV2 PCR - Normal    Influenza A PCR Negative      Influenza B PCR Negative      RSV PCR Negative      SARS CoV2 PCR Negative     IONIZED CALCIUM - Normal    Calcium Ionized Whole Blood 4.4       XR Chest 2 Views   Final Result   Impression: Clear chest      I have personally reviewed the examination and initial interpretation   and I agree with the findings.      JORDAN LOBO MD            SYSTEM ID:  [id number]         Reviewed emergency department encounter from 3/26/2024 for headache in setting of nitrous oxide and ketamine abuse.  Reviewed emergency room encounter from 8/19/2023 for full body numbness.  Reviewed previous CBC, competence of metabolic panel, EKG.    Critical care was not performed.     Medical Decision Making  The patient's presentation was of moderate complexity (an undiagnosed new problem with uncertain diagnosis).    The patient's evaluation involved:  review of external note(s) from 1 sources (see  separate area of note for details)  review of 3+ test result(s) ordered prior to this encounter (see separate area of note for details)  ordering and/or review of 3+ test(s) in this encounter (see separate area of note for details)  independent interpretation of testing performed by another health professional (EKG interpreted by me with above results.)    The patient's management necessitated only low risk treatment.    Assessment & Plan    38 year old male with history of polysubstance abuse to the emergency department after an episode of palpitations when he awoke this morning.  His symptoms are now resolved and he has normal heart rate here in the emergency department.  Differential diagnosis includes arrhythmia, sinus tachycardia, electrolyte disturbance, anemia, thyroid dysfunction, withdrawal.  Patient has normal vital signs here in the emergency department and unrevealing physical exam.  His EKG is normal sinus rhythm with flattened T waves.  No acute ischemic change.  Troponin is normal so not suspect ACS.  The patient's electrolytes are normal.  He is not significantly anemic.  His thyroid function is normal.  COVID and influenza testing negative.  He has no ongoing symptoms here in the emergency department.  Will discharge to home.  Primary care follow-up recommended for recheck and consideration of ambulatory heart monitor if symptoms persist or recur.  Return precautions provided.  Patient encouraged to seek outpatient chemical dependency evaluation and treatment.    I have reviewed the nursing notes. I have reviewed the findings, diagnosis, plan and need for follow up with the patient.    New Prescriptions    No medications on file       Final diagnoses:   Palpitations     Chart documentation was completed with Dragon voice-recognition software. Even though reviewed, this chart may still contain some grammatical, spelling, and word errors.     Long Heaton Md    [facility] EMERGENCY  DEPARTMENT  5/4/2024     Long Heaton MD  05/04/24 [id number]

## 2024-05-06 LAB
ATRIAL RATE - MUSE: 72 BPM
DIASTOLIC BLOOD PRESSURE - MUSE: NORMAL MMHG
INTERPRETATION ECG - MUSE: NORMAL
P AXIS - MUSE: 69 DEGREES
PR INTERVAL - MUSE: 152 MS
QRS DURATION - MUSE: 86 MS
QT - MUSE: 404 MS
QTC - MUSE: 442 MS
R AXIS - MUSE: 36 DEGREES
SYSTOLIC BLOOD PRESSURE - MUSE: NORMAL MMHG
T AXIS - MUSE: 13 DEGREES
VENTRICULAR RATE- MUSE: 72 BPM

## 2024-05-23 ENCOUNTER — TELEPHONE (OUTPATIENT)
Dept: NEUROLOGY | Facility: CLINIC | Age: 39
End: 2024-05-23
Payer: COMMERCIAL

## 2024-05-23 NOTE — TELEPHONE ENCOUNTER
Patient ok with moving up return appt to same appt two weeks sooner due to provider reschedule 7/31    Evelina Valencia on 5/23/2024 at 10:39 AM

## 2024-07-05 ENCOUNTER — HOSPITAL ENCOUNTER (EMERGENCY)
Facility: CLINIC | Age: 39
Discharge: HOME | End: 2024-07-05
Attending: STUDENT IN AN ORGANIZED HEALTH CARE EDUCATION/TRAINING PROGRAM | Admitting: STUDENT IN AN ORGANIZED HEALTH CARE EDUCATION/TRAINING PROGRAM
Payer: COMMERCIAL

## 2024-07-05 VITALS
OXYGEN SATURATION: 96 % | TEMPERATURE: 98.4 F | HEART RATE: 97 BPM | RESPIRATION RATE: 18 BRPM | DIASTOLIC BLOOD PRESSURE: 86 MMHG | SYSTOLIC BLOOD PRESSURE: 130 MMHG

## 2024-07-05 DIAGNOSIS — R44.3 HALLUCINATIONS: ICD-10-CM

## 2024-07-05 DIAGNOSIS — F18.90: ICD-10-CM

## 2024-07-05 DIAGNOSIS — R10.9 ABDOMINAL CRAMPING: ICD-10-CM

## 2024-07-05 LAB
ALBUMIN SERPL BCG-MCNC: 4 G/DL (ref 3.5–5.2)
ALP SERPL-CCNC: 75 U/L (ref 40–150)
ALT SERPL W P-5'-P-CCNC: 29 U/L (ref 0–70)
ANION GAP SERPL CALCULATED.3IONS-SCNC: 10 MMOL/L (ref 7–15)
AST SERPL W P-5'-P-CCNC: 16 U/L (ref 0–45)
BASOPHILS # BLD AUTO: 0 10E3/UL (ref 0–0.2)
BASOPHILS NFR BLD AUTO: 0 %
BILIRUB SERPL-MCNC: 0.5 MG/DL
BUN SERPL-MCNC: 24 MG/DL (ref 6–20)
CALCIUM SERPL-MCNC: 9 MG/DL (ref 8.6–10)
CHLORIDE SERPL-SCNC: 107 MMOL/L (ref 98–107)
CREAT SERPL-MCNC: 0.91 MG/DL (ref 0.67–1.17)
DEPRECATED HCO3 PLAS-SCNC: 23 MMOL/L (ref 22–29)
EGFRCR SERPLBLD CKD-EPI 2021: >90 ML/MIN/1.73M2
EOSINOPHIL # BLD AUTO: 0.2 10E3/UL (ref 0–0.7)
EOSINOPHIL NFR BLD AUTO: 3 %
ERYTHROCYTE [DISTWIDTH] IN BLOOD BY AUTOMATED COUNT: 13.2 % (ref 10–15)
GLUCOSE SERPL-MCNC: 151 MG/DL (ref 70–99)
HCT VFR BLD AUTO: 34.8 % (ref 40–53)
HGB BLD-MCNC: 11.6 G/DL (ref 13.3–17.7)
IMM GRANULOCYTES # BLD: 0.1 10E3/UL
IMM GRANULOCYTES NFR BLD: 1 %
LIPASE SERPL-CCNC: 32 U/L (ref 13–60)
LYMPHOCYTES # BLD AUTO: 1.9 10E3/UL (ref 0.8–5.3)
LYMPHOCYTES NFR BLD AUTO: 25 %
MCH RBC QN AUTO: 32.6 PG (ref 26.5–33)
MCHC RBC AUTO-ENTMCNC: 33.3 G/DL (ref 31.5–36.5)
MCV RBC AUTO: 98 FL (ref 78–100)
MONOCYTES # BLD AUTO: 0.6 10E3/UL (ref 0–1.3)
MONOCYTES NFR BLD AUTO: 8 %
NEUTROPHILS # BLD AUTO: 4.7 10E3/UL (ref 1.6–8.3)
NEUTROPHILS NFR BLD AUTO: 63 %
NRBC # BLD AUTO: 0 10E3/UL
NRBC BLD AUTO-RTO: 0 /100
PLATELET # BLD AUTO: 263 10E3/UL (ref 150–450)
POTASSIUM SERPL-SCNC: 4.3 MMOL/L (ref 3.4–5.3)
PROT SERPL-MCNC: 6.6 G/DL (ref 6.4–8.3)
RBC # BLD AUTO: 3.56 10E6/UL (ref 4.4–5.9)
SODIUM SERPL-SCNC: 140 MMOL/L (ref 135–145)
WBC # BLD AUTO: 7.5 10E3/UL (ref 4–11)

## 2024-07-05 PROCEDURE — 83690 ASSAY OF LIPASE: CPT | Performed by: STUDENT IN AN ORGANIZED HEALTH CARE EDUCATION/TRAINING PROGRAM

## 2024-07-05 PROCEDURE — 80053 COMPREHEN METABOLIC PANEL: CPT | Performed by: STUDENT IN AN ORGANIZED HEALTH CARE EDUCATION/TRAINING PROGRAM

## 2024-07-05 PROCEDURE — 99284 EMERGENCY DEPT VISIT MOD MDM: CPT | Performed by: STUDENT IN AN ORGANIZED HEALTH CARE EDUCATION/TRAINING PROGRAM

## 2024-07-05 PROCEDURE — 93005 ELECTROCARDIOGRAM TRACING: CPT | Performed by: STUDENT IN AN ORGANIZED HEALTH CARE EDUCATION/TRAINING PROGRAM

## 2024-07-05 PROCEDURE — 36415 COLL VENOUS BLD VENIPUNCTURE: CPT | Performed by: STUDENT IN AN ORGANIZED HEALTH CARE EDUCATION/TRAINING PROGRAM

## 2024-07-05 PROCEDURE — 85025 COMPLETE CBC W/AUTO DIFF WBC: CPT | Performed by: STUDENT IN AN ORGANIZED HEALTH CARE EDUCATION/TRAINING PROGRAM

## 2024-07-05 PROCEDURE — 93010 ELECTROCARDIOGRAM REPORT: CPT | Performed by: STUDENT IN AN ORGANIZED HEALTH CARE EDUCATION/TRAINING PROGRAM

## 2024-07-05 ASSESSMENT — COLUMBIA-SUICIDE SEVERITY RATING SCALE - C-SSRS
1. IN THE PAST MONTH, HAVE YOU WISHED YOU WERE DEAD OR WISHED YOU COULD GO TO SLEEP AND NOT WAKE UP?: YES
6. HAVE YOU EVER DONE ANYTHING, STARTED TO DO ANYTHING, OR PREPARED TO DO ANYTHING TO END YOUR LIFE?: YES
2. HAVE YOU ACTUALLY HAD ANY THOUGHTS OF KILLING YOURSELF IN THE PAST MONTH?: NO

## 2024-07-05 ASSESSMENT — ACTIVITIES OF DAILY LIVING (ADL)
ADLS_ACUITY_SCORE: 35
ADLS_ACUITY_SCORE: 35
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
## 2024-07-06 LAB
ATRIAL RATE - MUSE: 87 BPM
DIASTOLIC BLOOD PRESSURE - MUSE: NORMAL MMHG
INTERPRETATION ECG - MUSE: NORMAL
P AXIS - MUSE: NORMAL DEGREES
PR INTERVAL - MUSE: 144 MS
QRS DURATION - MUSE: 78 MS
QT - MUSE: 368 MS
QTC - MUSE: 442 MS
R AXIS - MUSE: 169 DEGREES
SYSTOLIC BLOOD PRESSURE - MUSE: NORMAL MMHG
T AXIS - MUSE: 161 DEGREES
VENTRICULAR RATE- MUSE: 87 BPM

## 2024-07-06 NOTE — PROGRESS NOTES
MD Jordan went over discharge plan with patient. Patient left before receiving AVS and resources for detox.
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
## 2024-10-09 PROBLEM — G32.0: Status: ACTIVE | Noted: 2024-07-05

## 2024-10-09 PROBLEM — F16.20: Status: RESOLVED | Noted: 2023-08-22 | Resolved: 2024-10-09

## 2024-10-09 PROBLEM — E53.8: Status: ACTIVE | Noted: 2024-07-05

## 2024-10-09 PROBLEM — R29.898 LEG WEAKNESS, BILATERAL: Status: ACTIVE | Noted: 2024-06-15

## 2024-10-09 PROBLEM — T41.0X5A: Status: ACTIVE | Noted: 2024-06-15

## 2024-10-09 PROBLEM — H93.13 TINNITUS OF BOTH EARS: Status: RESOLVED | Noted: 2022-02-03 | Resolved: 2024-10-09

## 2024-10-29 ENCOUNTER — TELEPHONE (OUTPATIENT)
Dept: NEUROLOGY | Facility: CLINIC | Age: 39
End: 2024-10-29

## 2024-10-29 NOTE — TELEPHONE ENCOUNTER
Sent Mychart (1st Attempt) and Left Voicemail (2nd Attempt) for the patient to call back and schedule the following:    Appointment type: Return Movement  Provider: Dr. Henry  Return date: next avail  Specialty phone number: [phone]  Additional appointment(s) needed:   Additonal Notes:

## 2024-10-29 NOTE — TELEPHONE ENCOUNTER
Left Voicemail (1st Attempt) and Sent Mychart (1st Attempt) for the patient to call back and schedule the following:    Appointment type: Return Movement  Provider: Dr. Henry  Return date: next avail  Specialty phone number: [phone]  Additional appointment(s) needed:   Additonal Notes:     Please resched todays appt (10/29/24)

## 2025-03-02 ENCOUNTER — HEALTH MAINTENANCE LETTER (OUTPATIENT)
Age: 40
End: 2025-03-02